# Patient Record
Sex: MALE | Race: BLACK OR AFRICAN AMERICAN | NOT HISPANIC OR LATINO | URBAN - METROPOLITAN AREA
[De-identification: names, ages, dates, MRNs, and addresses within clinical notes are randomized per-mention and may not be internally consistent; named-entity substitution may affect disease eponyms.]

---

## 2019-05-09 ENCOUNTER — EMERGENCY (EMERGENCY)
Facility: HOSPITAL | Age: 64
LOS: 0 days | Discharge: HOME | End: 2019-05-09
Attending: EMERGENCY MEDICINE | Admitting: EMERGENCY MEDICINE
Payer: MEDICARE

## 2019-05-09 VITALS
DIASTOLIC BLOOD PRESSURE: 78 MMHG | OXYGEN SATURATION: 100 % | HEART RATE: 92 BPM | TEMPERATURE: 97 F | RESPIRATION RATE: 18 BRPM | SYSTOLIC BLOOD PRESSURE: 130 MMHG

## 2019-05-09 VITALS — HEIGHT: 68 IN | WEIGHT: 130.07 LBS

## 2019-05-09 DIAGNOSIS — Z91.048 OTHER NONMEDICINAL SUBSTANCE ALLERGY STATUS: ICD-10-CM

## 2019-05-09 DIAGNOSIS — E78.49 OTHER HYPERLIPIDEMIA: ICD-10-CM

## 2019-05-09 DIAGNOSIS — R19.7 DIARRHEA, UNSPECIFIED: ICD-10-CM

## 2019-05-09 DIAGNOSIS — R11.2 NAUSEA WITH VOMITING, UNSPECIFIED: ICD-10-CM

## 2019-05-09 DIAGNOSIS — Z87.891 PERSONAL HISTORY OF NICOTINE DEPENDENCE: ICD-10-CM

## 2019-05-09 DIAGNOSIS — I10 ESSENTIAL (PRIMARY) HYPERTENSION: ICD-10-CM

## 2019-05-09 LAB
ALBUMIN SERPL ELPH-MCNC: 4.1 G/DL — SIGNIFICANT CHANGE UP (ref 3.5–5.2)
ALP SERPL-CCNC: 63 U/L — SIGNIFICANT CHANGE UP (ref 30–115)
ALT FLD-CCNC: 15 U/L — SIGNIFICANT CHANGE UP (ref 0–41)
ANION GAP SERPL CALC-SCNC: 12 MMOL/L — SIGNIFICANT CHANGE UP (ref 7–14)
APPEARANCE UR: CLEAR — SIGNIFICANT CHANGE UP
AST SERPL-CCNC: 24 U/L — SIGNIFICANT CHANGE UP (ref 0–41)
BASOPHILS # BLD AUTO: 0 K/UL — SIGNIFICANT CHANGE UP (ref 0–0.2)
BASOPHILS NFR BLD AUTO: 0 % — SIGNIFICANT CHANGE UP (ref 0–1)
BILIRUB SERPL-MCNC: 0.3 MG/DL — SIGNIFICANT CHANGE UP (ref 0.2–1.2)
BILIRUB UR-MCNC: NEGATIVE — SIGNIFICANT CHANGE UP
BUN SERPL-MCNC: 18 MG/DL — SIGNIFICANT CHANGE UP (ref 10–20)
CALCIUM SERPL-MCNC: 9.2 MG/DL — SIGNIFICANT CHANGE UP (ref 8.5–10.1)
CHLORIDE SERPL-SCNC: 105 MMOL/L — SIGNIFICANT CHANGE UP (ref 98–110)
CO2 SERPL-SCNC: 28 MMOL/L — SIGNIFICANT CHANGE UP (ref 17–32)
COLOR SPEC: YELLOW — SIGNIFICANT CHANGE UP
CREAT SERPL-MCNC: 1 MG/DL — SIGNIFICANT CHANGE UP (ref 0.7–1.5)
DIFF PNL FLD: NEGATIVE — SIGNIFICANT CHANGE UP
EOSINOPHIL # BLD AUTO: 0.06 K/UL — SIGNIFICANT CHANGE UP (ref 0–0.7)
EOSINOPHIL NFR BLD AUTO: 1.3 % — SIGNIFICANT CHANGE UP (ref 0–8)
GLUCOSE SERPL-MCNC: 107 MG/DL — HIGH (ref 70–99)
GLUCOSE UR QL: NEGATIVE MG/DL — SIGNIFICANT CHANGE UP
HCT VFR BLD CALC: 34.8 % — LOW (ref 42–52)
HGB BLD-MCNC: 11.3 G/DL — LOW (ref 14–18)
IMM GRANULOCYTES NFR BLD AUTO: 0.2 % — SIGNIFICANT CHANGE UP (ref 0.1–0.3)
KETONES UR-MCNC: NEGATIVE — SIGNIFICANT CHANGE UP
LEUKOCYTE ESTERASE UR-ACNC: NEGATIVE — SIGNIFICANT CHANGE UP
LIDOCAIN IGE QN: 25 U/L — SIGNIFICANT CHANGE UP (ref 7–60)
LYMPHOCYTES # BLD AUTO: 1.26 K/UL — SIGNIFICANT CHANGE UP (ref 1.2–3.4)
LYMPHOCYTES # BLD AUTO: 26.5 % — SIGNIFICANT CHANGE UP (ref 20.5–51.1)
MCHC RBC-ENTMCNC: 29.3 PG — SIGNIFICANT CHANGE UP (ref 27–31)
MCHC RBC-ENTMCNC: 32.5 G/DL — SIGNIFICANT CHANGE UP (ref 32–37)
MCV RBC AUTO: 90.2 FL — SIGNIFICANT CHANGE UP (ref 80–94)
MONOCYTES # BLD AUTO: 0.5 K/UL — SIGNIFICANT CHANGE UP (ref 0.1–0.6)
MONOCYTES NFR BLD AUTO: 10.5 % — HIGH (ref 1.7–9.3)
NEUTROPHILS # BLD AUTO: 2.93 K/UL — SIGNIFICANT CHANGE UP (ref 1.4–6.5)
NEUTROPHILS NFR BLD AUTO: 61.5 % — SIGNIFICANT CHANGE UP (ref 42.2–75.2)
NITRITE UR-MCNC: NEGATIVE — SIGNIFICANT CHANGE UP
NRBC # BLD: 0 /100 WBCS — SIGNIFICANT CHANGE UP (ref 0–0)
PH UR: 7 — SIGNIFICANT CHANGE UP (ref 5–8)
PLATELET # BLD AUTO: 228 K/UL — SIGNIFICANT CHANGE UP (ref 130–400)
POTASSIUM SERPL-MCNC: 3.5 MMOL/L — SIGNIFICANT CHANGE UP (ref 3.5–5)
POTASSIUM SERPL-SCNC: 3.5 MMOL/L — SIGNIFICANT CHANGE UP (ref 3.5–5)
PROT SERPL-MCNC: 6.3 G/DL — SIGNIFICANT CHANGE UP (ref 6–8)
PROT UR-MCNC: NEGATIVE MG/DL — SIGNIFICANT CHANGE UP
RBC # BLD: 3.86 M/UL — LOW (ref 4.7–6.1)
RBC # FLD: 13.2 % — SIGNIFICANT CHANGE UP (ref 11.5–14.5)
SODIUM SERPL-SCNC: 145 MMOL/L — SIGNIFICANT CHANGE UP (ref 135–146)
SP GR SPEC: 1.01 — SIGNIFICANT CHANGE UP (ref 1.01–1.03)
UROBILINOGEN FLD QL: 0.2 MG/DL — SIGNIFICANT CHANGE UP (ref 0.2–0.2)
WBC # BLD: 4.76 K/UL — LOW (ref 4.8–10.8)
WBC # FLD AUTO: 4.76 K/UL — LOW (ref 4.8–10.8)

## 2019-05-09 PROCEDURE — 99284 EMERGENCY DEPT VISIT MOD MDM: CPT

## 2019-05-09 RX ORDER — FAMOTIDINE 10 MG/ML
20 INJECTION INTRAVENOUS DAILY
Refills: 0 | Status: DISCONTINUED | OUTPATIENT
Start: 2019-05-09 | End: 2019-05-09

## 2019-05-09 RX ORDER — SODIUM CHLORIDE 9 MG/ML
1000 INJECTION INTRAMUSCULAR; INTRAVENOUS; SUBCUTANEOUS ONCE
Refills: 0 | Status: COMPLETED | OUTPATIENT
Start: 2019-05-09 | End: 2019-05-09

## 2019-05-09 RX ORDER — ONDANSETRON 8 MG/1
4 TABLET, FILM COATED ORAL ONCE
Refills: 0 | Status: COMPLETED | OUTPATIENT
Start: 2019-05-09 | End: 2019-05-09

## 2019-05-09 RX ADMIN — FAMOTIDINE 20 MILLIGRAM(S): 10 INJECTION INTRAVENOUS at 12:03

## 2019-05-09 RX ADMIN — SODIUM CHLORIDE 1000 MILLILITER(S): 9 INJECTION INTRAMUSCULAR; INTRAVENOUS; SUBCUTANEOUS at 11:31

## 2019-05-09 RX ADMIN — FAMOTIDINE 104 MILLIGRAM(S): 10 INJECTION INTRAVENOUS at 11:39

## 2019-05-09 RX ADMIN — SODIUM CHLORIDE 1000 MILLILITER(S): 9 INJECTION INTRAMUSCULAR; INTRAVENOUS; SUBCUTANEOUS at 13:42

## 2019-05-09 RX ADMIN — ONDANSETRON 4 MILLIGRAM(S): 8 TABLET, FILM COATED ORAL at 11:31

## 2019-05-09 NOTE — ED PROVIDER NOTE - PMH
Arthritis    Dyslipidemia    Emphysema lung    HTN (hypertension)    Neuropathy    Prostate CA  for 12 years

## 2019-05-09 NOTE — ED PROVIDER NOTE - CLINICAL SUMMARY MEDICAL DECISION MAKING FREE TEXT BOX
pt aware of all labs and imaging, will give copy, reports feeling much better no vomiting or diarrhea in ed, abd re exam soft ntnd, no t/g, tolerated po, aware of signs and symptoms to return for, proper hdyration, will follow up with pmd and gi.

## 2019-05-09 NOTE — ED ADULT NURSE NOTE - OBJECTIVE STATEMENT
patient ate a bunch of different foods, cheese Vincentian pork skin cakes at [facility] then had n/v/d. states no sob

## 2019-05-09 NOTE — ED PROVIDER NOTE - NS ED ROS FT
Constitutional:  No behavior changes, fevers/chills.    Head: No injury, headache, LOC, dizziness/LH.    Eyes:  No visual changes, eye pain, redness, or discharge; no injury; no foreign body sensation.    ENMT:  No ear pain or discharge, hearing problems; no pain or injuries to the nose, ears, mouth, or throat.    Neck:  No pain, stiffness, injury; no loss of range of motion.    Cardiac: No chest pain, palpitations, diaphoresis.    Chest/respiratory: No cough, wheezing, shortness of breath, chest tightness, or trouble breathing; no injuries.    GI: (+) nausea and vomiting, nbnb. (+) Diarrhea. No current abd pain. No changes in PO intake. No melena/brbpr.    :  No dysuria, hematuria, urgency, or injuries. No changes in urine output. No flank pain.    MS: No pain, weakness, injury, numbness or tingling; no loss of range of motion. No edema. No calf pain/swelling/erythema.    Back:  No pain or injury.    Skin:  No rashes or color changes; no lacerations or abrasions.  Social: No sick contacts, recent travel.

## 2019-05-09 NOTE — ED PROVIDER NOTE - NSFOLLOWUPINSTRUCTIONS_ED_ALL_ED_FT
Nausea / Vomiting    Nausea is the feeling that you have to vomit. As nausea gets worse, it can lead to vomiting. Vomiting puts you at an increased risk for dehydration. Older adults and people with other diseases or a weak immune system are at higher risk for dehydration. Drink clear fluids in small but frequent amounts as tolerated. Eat bland, easy-to-digest foods in small amounts as tolerated.    SEEK IMMEDIATE MEDICAL CARE IF YOU HAVE ANY OF THE FOLLOWING SYMPTOMS: fever, inability to keep sufficient fluids down, black or bloody vomitus, black or bloody stools, lightheadedness/dizziness, chest pain, severe headache, rash, shortness of breath, cold or clammy skin, confusion, pain with urination, or any signs of dehydration.    Diarrhea    Diarrhea is frequent loose or watery bowel movements that has many causes. Diarrhea can make you feel weak and cause you to become dehydrated. Diarrhea typically lasts 2–3 days, but can last longer if it is a sign of something more serious. Drink clear fluids to prevent dehydration. Eat bland, easy-to-digest foods as tolerated.     SEEK IMMEDIATE MEDICAL CARE IF YOU HAVE ANY OF THE FOLLOWING SYMPTOMS: high fevers, lightheadedness/dizziness, chest pain, black or bloody stools, shortness of breath, severe abdominal or back pain, or any signs of dehydration.

## 2019-05-09 NOTE — ED PROVIDER NOTE - CARE PROVIDER_API CALL
Charly Gardiner)  Gastroenterology; Internal Medicine  [address] [place] [address]  Phone: [phone]  Fax: [phone]  Follow Up Time:

## 2019-05-09 NOTE — ED PROVIDER NOTE - PHYSICAL EXAMINATION
Vital Signs: I have reviewed the initial vital signs.  Constitutional: WDWN in nad. Sitting on stretcher speaking full sentences.   Integumentary: No rash.  ENT: MMM  NECK: Supple, non-tender, no meningeal signs.  Cardiovascular: RRR, radial pulses 2/4 b/l. No JVD.  Respiratory: BS present b/l, ctabl, no wheezing or crackles, good air exchange, good resp effort and excursion, no accessory muscle use, no stridor.  Gastrointestinal: BS present throughout all 4 quadrants, soft, nd, nt no rebound tenderness or guarding, no cvat. (-) salcido's (-) rovsing (-) obturator (-) psoas  Musculoskeletal: FROM, no edema, no calf pain/swelling/erythema.  Neurologic: AAOx3, motor 5/5 and sensation intact throughout upper and lower ext, CN II-XII intact, No facial droop or slurring of speech. No focal deficits.

## 2019-05-09 NOTE — ED PROVIDER NOTE - OBJECTIVE STATEMENT
A 62 y/o m w/ pmhx of copd not on home 02, ex smoker quit 6 years ago, etoh absue in the past, also stopped drinking ~ 6 years ago, L foot drop, neuripathy, htn, gerd, migraines, prostrate cancer s/p radiation in [place], presents with nausea, vomiting x1 yesterday, nbnb, and diarrhea. started at 6p, took loperamide which helped the diarrhea, one episode today. reports he ate chinese food yesterday including rice, cheese and cake from the restaurant, significnat other at this too but she did not develop symptoms. A 64 y/o m w/ pmhx of copd not on home 02, ex smoker quit 6 years ago, etoh abuse in the past, also stopped drinking ~ 6 years ago, L foot drop, neuropathy, htn, gerd, migraines, prostrate cancer s/p radiation in remission, osteoarthritis, presents with nausea, vomiting x1 yesterday, nbnb, and diarrhea. started at 6p, took loperamide which helped the diarrhea, one episode today. reports he ate chinese food yesterday including rice, cheese and cake from the restaurant, significant other at this too but she did not develop symptoms. developed symptoms shortly after eating the food. No fever, chills,chest pain, sob, abd pain, urinary symptoms, melena/brbpr, flank pain, or recent travel.

## 2019-05-09 NOTE — ED PROVIDER NOTE - NSFOLLOWUPCLINICS_GEN_ALL_ED_FT
[facility]  Medicine  [address], [place]   Phone: [phone]  Fax:   Follow Up Time:

## 2019-05-10 LAB
CULTURE RESULTS: NO GROWTH — SIGNIFICANT CHANGE UP
SPECIMEN SOURCE: SIGNIFICANT CHANGE UP

## 2021-02-22 ENCOUNTER — EMERGENCY (EMERGENCY)
Facility: HOSPITAL | Age: 66
LOS: 0 days | Discharge: HOME | End: 2021-02-22
Attending: EMERGENCY MEDICINE | Admitting: EMERGENCY MEDICINE
Payer: MEDICARE

## 2021-02-22 VITALS
HEIGHT: 68 IN | HEART RATE: 80 BPM | WEIGHT: 160.06 LBS | SYSTOLIC BLOOD PRESSURE: 126 MMHG | OXYGEN SATURATION: 99 % | RESPIRATION RATE: 18 BRPM | TEMPERATURE: 98 F | DIASTOLIC BLOOD PRESSURE: 78 MMHG

## 2021-02-22 DIAGNOSIS — I10 ESSENTIAL (PRIMARY) HYPERTENSION: ICD-10-CM

## 2021-02-22 DIAGNOSIS — E78.5 HYPERLIPIDEMIA, UNSPECIFIED: ICD-10-CM

## 2021-02-22 DIAGNOSIS — K08.89 OTHER SPECIFIED DISORDERS OF TEETH AND SUPPORTING STRUCTURES: ICD-10-CM

## 2021-02-22 DIAGNOSIS — J43.9 EMPHYSEMA, UNSPECIFIED: ICD-10-CM

## 2021-02-22 DIAGNOSIS — Z87.39 PERSONAL HISTORY OF OTHER DISEASES OF THE MUSCULOSKELETAL SYSTEM AND CONNECTIVE TISSUE: ICD-10-CM

## 2021-02-22 PROBLEM — G62.9 POLYNEUROPATHY, UNSPECIFIED: Chronic | Status: ACTIVE | Noted: 2019-05-09

## 2021-02-22 PROBLEM — C61 MALIGNANT NEOPLASM OF PROSTATE: Chronic | Status: ACTIVE | Noted: 2019-05-09

## 2021-02-22 PROBLEM — M19.90 UNSPECIFIED OSTEOARTHRITIS, UNSPECIFIED SITE: Chronic | Status: ACTIVE | Noted: 2019-05-09

## 2021-02-22 PROCEDURE — 99282 EMERGENCY DEPT VISIT SF MDM: CPT

## 2021-02-22 NOTE — ED PROVIDER NOTE - NS ED ROS FT
Eyes:  No visual changes, eye pain or discharge.  ENMT: Endorses dental pain.   Cardiac:  No chest pain, SOB or edema. No chest pain with exertion.  Respiratory:  No cough or respiratory distress. No hemoptysis. No history of asthma or RAD.  GI:  No nausea, vomiting, diarrhea or abdominal pain.  :  No dysuria, frequency or burning.  MS:  No myalgia, muscle weakness, joint pain or back pain.  Neuro:  No headache or weakness.  No LOC.  Skin:  No skin rash.   Endocrine: No history of thyroid disease or diabetes.

## 2021-02-22 NOTE — ED PROVIDER NOTE - CLINICAL SUMMARY MEDICAL DECISION MAKING FREE TEXT BOX
Patient presented with atraumatic right upper molar pain x 1 week. Otherwise afebrile, HD stable, tolerating PO at home, airway patent, clearing secretions without difficulty, speaking full sentences, no tongue elevation or cervical LAD. No signs of abscess on exam. Given the above, will refer to dental clinic for further management. Patient agreeable with plan. Agrees to return to ED for any new or worsening symptoms.

## 2021-02-22 NOTE — ED PROVIDER NOTE - ATTENDING CONTRIBUTION TO CARE
65 year old male, pmhx as documented, presenting with right upper molar pain x 1 week described as achy, non-radiating, worse with chewing, 5/10 at its worst, no palliative factors. Has been able to tolerate PO despite pain. Otherwise denies fevers or any other complaints.    Exam shows (+) tenderness to right upper molars to palpation but no evidence of infection, uvula midline, no other erythema or swelling to other oropharyngeal structures, tolerating PO.    Will refer to dental clinic.

## 2021-02-22 NOTE — ED ADULT NURSE NOTE - NSIMPLEMENTINTERV_GEN_ALL_ED
Implemented All Universal Safety Interventions:  [place] to call system. Call bell, personal items and telephone within reach. Instruct patient to call for assistance. Room bathroom lighting operational. Non-slip footwear when patient is off stretcher. Physically safe environment: no spills, clutter or unnecessary equipment. Stretcher in lowest position, wheels locked, appropriate side rails in place.

## 2021-02-22 NOTE — CONSULT NOTE ADULT - SUBJECTIVE AND OBJECTIVE BOX
Patient is a 65y old  Male who presents with a chief complaint of pain in lower right (#31)    HPI: Pt has pain in lower right starting 2 weeks ago, wants tooth extracted      PAST MEDICAL & SURGICAL HISTORY:  Arthritis    Emphysema lung    Prostate CA  for 12 years    Dyslipidemia    Neuropathy    HTN (hypertension)          MEDICATIONS:  Excedrin  Gabapentin  Calcium  Ambience  Amlodipine      Allergies    dust (Unknown)  cats  No Known Drug Allergies    Intolerances            *SOCIAL HISTORY: ( - ) Tobacco; ( - ) ETOH: history of drug/alcohol abuse, last drink was in 2013    *Last Dental Visit: last week    Vital Signs Last 24 Hrs  T(C): 36.7 (22 Feb 2021 12:50), Max: 36.7 (22 Feb 2021 12:50)  T(F): 98 (22 Feb 2021 12:50), Max: 98 (22 Feb 2021 12:50)  HR: 80 (22 Feb 2021 12:50) (80 - 80)  BP: 126/78 (22 Feb 2021 12:50) (126/78 - 126/78)  BP(mean): --  RR: 18 (22 Feb 2021 12:50) (18 - 18)  SpO2: 99% (22 Feb 2021 12:50) (99% - 99%)    LABS:                  EOE:  TMJ ( - ) clicks                     ( - ) pops                     ( - ) crepitus             Mandible FROM             Facial bones and MOM grossly intact             ( - ) trismus             ( - ) lymphadenopathy             ( - ) swelling             ( - ) asymmetry             ( - ) palpation             ( - ) dyspnea             ( - ) dysphagia             ( - ) loss of consciousness    IOE:   permanent dentition: multiple missing teeth           hard/soft palate:  No pathology noted           tongue/FOM No pathology noted           labial/buccal mucosa No pathology noted           ( +/- ) percussion: #31 slightly positive to percussion           ( - ) palpation           ( - ) swelling            ( - ) abscess           ( - ) sinus tract    Mobility: no  Caries: yes        *DENTAL RADIOGRAPHS: 1 PA, 1 post-ext PA    RADIOLOGY & ADDITIONAL STUDIES:    *ASSESSMENT: caries #31      *PLAN: extraction    PROCEDURE:   Temp: 98.4 F  BP: 140/60 P: 80  K02.9  Tx options of RCT vs ext discussed and explained to pt. Pt opted to have #31 extracted. Risks and benefits discussed as per OS sheet dated 07/13/00. Consent obtained. Side site marked. Anesthesia administered: 1 carpule of 4% Septocaine (1:100k epi) via IANB, 0.5 carpule of 4% Septocaine (1:100k epi) via long buccal nerve block. Tooth #31 sectioned and extracted via elevators and forceps. Socket curetted and irrigated. Hemostasis achieved, gauze placed. Post op instructions given written and verbally: no smoking, no hot/spicy foods or beverages, no spitting, no straw use, soft diet. Rx sent for Amoxicillin 500mg TID x 1 wk, Ibuprofen q4-6hrs prn for pain. No complications.       RECOMMENDATIONS:  1) Finish Rx as prescribed: Amoxicillin 500mg TID x 1 wk, Ibuprofen q4-6hrs prn for pain  2) Follow post op instructions as were given: no smoking, no hot/spicy foods or beverages, no spitting, no straw use, soft diet  3) Dental F/U with outpatient dentist for comprehensive dental care.   4) If any difficulty swallowing/breathing, fever occur, return to ER.     Jil Mcmahon DDS, Pager #[phone]

## 2021-02-22 NOTE — ED PROVIDER NOTE - OBJECTIVE STATEMENT
65 y m p w/ dental pain in the right upper molar for a week. Pain is 5/10, worse with chewing. Pt. feels that his filling has fallen out.

## 2021-02-22 NOTE — ED PROVIDER NOTE - PHYSICAL EXAMINATION
CONSTITUTIONAL: Well-developed; well-nourished; in no acute distress.   SKIN: warm, dry  HEAD: Normocephalic; atraumatic.  EYES: PERRL, EOMI, no conjunctival erythema  ENT: Right upper molar tender to palpation.   NECK: Supple; non tender.  CARD: S1, S2 normal; no murmurs, gallops, or rubs. Regular rate and rhythm.   RESP: No wheezes, rales or rhonchi.  ABD: soft ntnd  EXT: Normal ROM.  No clubbing, cyanosis or edema.   LYMPH: No acute cervical adenopathy.  NEURO: Alert, oriented, grossly unremarkable  PSYCH: Cooperative, appropriate.

## 2022-12-16 ENCOUNTER — INPATIENT (INPATIENT)
Facility: HOSPITAL | Age: 67
LOS: 5 days | Discharge: HOME | End: 2022-12-22
Attending: STUDENT IN AN ORGANIZED HEALTH CARE EDUCATION/TRAINING PROGRAM | Admitting: STUDENT IN AN ORGANIZED HEALTH CARE EDUCATION/TRAINING PROGRAM
Payer: MEDICARE

## 2022-12-16 VITALS
HEART RATE: 68 BPM | DIASTOLIC BLOOD PRESSURE: 64 MMHG | SYSTOLIC BLOOD PRESSURE: 99 MMHG | TEMPERATURE: 96 F | OXYGEN SATURATION: 95 % | RESPIRATION RATE: 20 BRPM

## 2022-12-16 LAB
ACANTHOCYTES BLD QL SMEAR: SLIGHT — SIGNIFICANT CHANGE UP
ALBUMIN SERPL ELPH-MCNC: 5.1 G/DL — SIGNIFICANT CHANGE UP (ref 3.5–5.2)
ALP SERPL-CCNC: 83 U/L — SIGNIFICANT CHANGE UP (ref 30–115)
ALT FLD-CCNC: 15 U/L — SIGNIFICANT CHANGE UP (ref 0–41)
ANION GAP SERPL CALC-SCNC: 18 MMOL/L — HIGH (ref 7–14)
ANISOCYTOSIS BLD QL: SLIGHT — SIGNIFICANT CHANGE UP
APPEARANCE UR: ABNORMAL
AST SERPL-CCNC: 24 U/L — SIGNIFICANT CHANGE UP (ref 0–41)
BASE EXCESS BLDV CALC-SCNC: 6.6 MMOL/L — HIGH (ref -2–3)
BASOPHILS # BLD AUTO: 0 K/UL — SIGNIFICANT CHANGE UP (ref 0–0.2)
BASOPHILS NFR BLD AUTO: 0 % — SIGNIFICANT CHANGE UP (ref 0–1)
BILIRUB SERPL-MCNC: 0.6 MG/DL — SIGNIFICANT CHANGE UP (ref 0.2–1.2)
BILIRUB UR-MCNC: ABNORMAL
BUN SERPL-MCNC: 31 MG/DL — HIGH (ref 10–20)
CA-I SERPL-SCNC: 1.25 MMOL/L — SIGNIFICANT CHANGE UP (ref 1.15–1.33)
CALCIUM SERPL-MCNC: 10.8 MG/DL — HIGH (ref 8.4–10.5)
CHLORIDE SERPL-SCNC: 90 MMOL/L — LOW (ref 98–110)
CO2 SERPL-SCNC: 28 MMOL/L — SIGNIFICANT CHANGE UP (ref 17–32)
COLOR SPEC: ABNORMAL
CREAT SERPL-MCNC: 2.2 MG/DL — HIGH (ref 0.7–1.5)
DIFF PNL FLD: ABNORMAL
EGFR: 32 ML/MIN/1.73M2 — LOW
EOSINOPHIL # BLD AUTO: 0 K/UL — SIGNIFICANT CHANGE UP (ref 0–0.7)
EOSINOPHIL NFR BLD AUTO: 0 % — SIGNIFICANT CHANGE UP (ref 0–8)
FLUAV AG NPH QL: SIGNIFICANT CHANGE UP
FLUBV AG NPH QL: SIGNIFICANT CHANGE UP
GAS PNL BLDV: 131 MMOL/L — LOW (ref 136–145)
GAS PNL BLDV: SIGNIFICANT CHANGE UP
GIANT PLATELETS BLD QL SMEAR: PRESENT — SIGNIFICANT CHANGE UP
GLUCOSE SERPL-MCNC: 137 MG/DL — HIGH (ref 70–99)
GLUCOSE UR QL: SIGNIFICANT CHANGE UP
HCO3 BLDV-SCNC: 34 MMOL/L — HIGH (ref 22–29)
HCT VFR BLD CALC: 46.2 % — SIGNIFICANT CHANGE UP (ref 42–52)
HCT VFR BLDA CALC: 38 % — LOW (ref 39–51)
HGB BLD CALC-MCNC: 12.8 G/DL — SIGNIFICANT CHANGE UP (ref 12.6–17.4)
HGB BLD-MCNC: 15.8 G/DL — SIGNIFICANT CHANGE UP (ref 14–18)
KETONES UR-MCNC: SIGNIFICANT CHANGE UP
LACTATE BLDV-MCNC: 2.5 MMOL/L — HIGH (ref 0.5–2)
LEUKOCYTE ESTERASE UR-ACNC: NEGATIVE — SIGNIFICANT CHANGE UP
LIDOCAIN IGE QN: 22 U/L — SIGNIFICANT CHANGE UP (ref 7–60)
LYMPHOCYTES # BLD AUTO: 1.2 K/UL — SIGNIFICANT CHANGE UP (ref 1.2–3.4)
LYMPHOCYTES # BLD AUTO: 20 % — LOW (ref 20.5–51.1)
MANUAL SMEAR VERIFICATION: SIGNIFICANT CHANGE UP
MCHC RBC-ENTMCNC: 29.9 PG — SIGNIFICANT CHANGE UP (ref 27–31)
MCHC RBC-ENTMCNC: 34.2 G/DL — SIGNIFICANT CHANGE UP (ref 32–37)
MCV RBC AUTO: 87.5 FL — SIGNIFICANT CHANGE UP (ref 80–94)
METAMYELOCYTES # FLD: 6.1 % — HIGH (ref 0–0)
MONOCYTES # BLD AUTO: 1.15 K/UL — HIGH (ref 0.1–0.6)
MONOCYTES NFR BLD AUTO: 19.1 % — HIGH (ref 1.7–9.3)
NEUTROPHILS # BLD AUTO: 3.08 K/UL — SIGNIFICANT CHANGE UP (ref 1.4–6.5)
NEUTROPHILS NFR BLD AUTO: 40.9 % — LOW (ref 42.2–75.2)
NEUTS BAND # BLD: 10.4 % — HIGH (ref 0–6)
NITRITE UR-MCNC: NEGATIVE — SIGNIFICANT CHANGE UP
PCO2 BLDV: 60 MMHG — HIGH (ref 42–55)
PH BLDV: 7.36 — SIGNIFICANT CHANGE UP (ref 7.32–7.43)
PH UR: 6 — SIGNIFICANT CHANGE UP (ref 5–8)
PLAT MORPH BLD: NORMAL — SIGNIFICANT CHANGE UP
PLATELET # BLD AUTO: 337 K/UL — SIGNIFICANT CHANGE UP (ref 130–400)
PO2 BLDV: 24 MMHG — SIGNIFICANT CHANGE UP
POIKILOCYTOSIS BLD QL AUTO: SIGNIFICANT CHANGE UP
POTASSIUM BLDV-SCNC: 3.7 MMOL/L — SIGNIFICANT CHANGE UP (ref 3.5–5.1)
POTASSIUM SERPL-MCNC: 4 MMOL/L — SIGNIFICANT CHANGE UP (ref 3.5–5)
POTASSIUM SERPL-SCNC: 4 MMOL/L — SIGNIFICANT CHANGE UP (ref 3.5–5)
PROT SERPL-MCNC: 8.2 G/DL — HIGH (ref 6–8)
PROT UR-MCNC: ABNORMAL
RBC # BLD: 5.28 M/UL — SIGNIFICANT CHANGE UP (ref 4.7–6.1)
RBC # FLD: 13 % — SIGNIFICANT CHANGE UP (ref 11.5–14.5)
RBC BLD AUTO: ABNORMAL
RSV RNA NPH QL NAA+NON-PROBE: SIGNIFICANT CHANGE UP
SAO2 % BLDV: 34.8 % — SIGNIFICANT CHANGE UP
SARS-COV-2 RNA SPEC QL NAA+PROBE: SIGNIFICANT CHANGE UP
SODIUM SERPL-SCNC: 136 MMOL/L — SIGNIFICANT CHANGE UP (ref 135–146)
SP GR SPEC: 1.03 — SIGNIFICANT CHANGE UP (ref 1.01–1.03)
UROBILINOGEN FLD QL: ABNORMAL
VARIANT LYMPHS # BLD: 3.5 % — SIGNIFICANT CHANGE UP (ref 0–5)
WBC # BLD: 6.01 K/UL — SIGNIFICANT CHANGE UP (ref 4.8–10.8)
WBC # FLD AUTO: 6.01 K/UL — SIGNIFICANT CHANGE UP (ref 4.8–10.8)

## 2022-12-16 PROCEDURE — 74177 CT ABD & PELVIS W/CONTRAST: CPT | Mod: 26,MA

## 2022-12-16 PROCEDURE — 99285 EMERGENCY DEPT VISIT HI MDM: CPT

## 2022-12-16 RX ORDER — SODIUM CHLORIDE 9 MG/ML
2000 INJECTION, SOLUTION INTRAVENOUS ONCE
Refills: 0 | Status: COMPLETED | OUTPATIENT
Start: 2022-12-16 | End: 2022-12-16

## 2022-12-16 RX ORDER — MORPHINE SULFATE 50 MG/1
4 CAPSULE, EXTENDED RELEASE ORAL ONCE
Refills: 0 | Status: DISCONTINUED | OUTPATIENT
Start: 2022-12-16 | End: 2022-12-16

## 2022-12-16 RX ORDER — VANCOMYCIN HCL 1 G
1000 VIAL (EA) INTRAVENOUS ONCE
Refills: 0 | Status: COMPLETED | OUTPATIENT
Start: 2022-12-16 | End: 2022-12-16

## 2022-12-16 RX ORDER — CEFEPIME 1 G/1
2000 INJECTION, POWDER, FOR SOLUTION INTRAMUSCULAR; INTRAVENOUS ONCE
Refills: 0 | Status: COMPLETED | OUTPATIENT
Start: 2022-12-16 | End: 2022-12-16

## 2022-12-16 RX ORDER — ONDANSETRON 8 MG/1
4 TABLET, FILM COATED ORAL ONCE
Refills: 0 | Status: COMPLETED | OUTPATIENT
Start: 2022-12-16 | End: 2022-12-16

## 2022-12-16 RX ORDER — ACETAMINOPHEN 500 MG
650 TABLET ORAL ONCE
Refills: 0 | Status: COMPLETED | OUTPATIENT
Start: 2022-12-16 | End: 2022-12-16

## 2022-12-16 RX ADMIN — Medication 650 MILLIGRAM(S): at 21:23

## 2022-12-16 RX ADMIN — SODIUM CHLORIDE 2000 MILLILITER(S): 9 INJECTION, SOLUTION INTRAVENOUS at 21:04

## 2022-12-16 RX ADMIN — MORPHINE SULFATE 4 MILLIGRAM(S): 50 CAPSULE, EXTENDED RELEASE ORAL at 21:05

## 2022-12-16 RX ADMIN — ONDANSETRON 4 MILLIGRAM(S): 8 TABLET, FILM COATED ORAL at 21:05

## 2022-12-16 RX ADMIN — Medication 250 MILLIGRAM(S): at 23:00

## 2022-12-16 NOTE — ED PROVIDER NOTE - CLINICAL SUMMARY MEDICAL DECISION MAKING FREE TEXT BOX
ct showing sbo 2/2 incarcerated hernia, abx given for bandemia  surg c/s placed, NGT placed and hernia reduced  surg rec admission for monitoring and medical management

## 2022-12-16 NOTE — ED PROVIDER NOTE - ATTENDING CONTRIBUTION TO CARE
67 y.o. M, PMH of HTN, HLD, COPD not on home O2, Prostate Ca s/p radiation 4 years ago, recent penile implant 4 weeks ago (Dr. Juarez, [facility]) presenting with 3 days of constant, non-radiating, sharp periumbilical abdominal pain associated with nausea and 3 episodes of NBNB vomiting and decreased PO intake. Patient denies new foods/medications. States felt feverish at home but did not take his temp. No CP, SOB, diarrhea, dysuria, hematuria, melena, hematochezia, recent travel/sick contacts. Former alcohol user 10 years ago. On exam, pt in NAD, AAOx3, head NC/AT, CN II-XII intact, PEERL, EOMi, neck (-) midline tenderness, lungs CTA B/L, CV S1S2 regular, abdomen soft/discomfort over left side of abdomen, firm abdomen/ND/(+)BS, ext (-) edema, motor 5/5x4, sensation intact, ambulating with steady gait. Will do labs, CT, UA and reevaluate. 67 y.o. M, PMH of HTN, HLD, COPD not on home O2, Prostate Ca s/p radiation 4 years ago, recent penile implant 4 weeks ago (Dr. Juarez, [facility]) presenting with 3 days of constant, non-radiating, sharp periumbilical abdominal pain associated with nausea and 3 episodes of NBNB vomiting and decreased PO intake. Patient denies new foods/medications. States felt feverish at home but did not take his temp. No CP, SOB, diarrhea, dysuria, hematuria, melena, hematochezia, recent travel/sick contacts. no testicular pain. Former alcohol user 10 years ago. On exam, pt in NAD, AAOx3, head NC/AT, CN II-XII intact, PEERL, EOMi, neck (-) midline tenderness, lungs CTA B/L, CV S1S2 regular, abdomen soft/discomfort over left side of abdomen, firm abdomen/ND/(+)BS, ext (-) edema, motor 5/5x4, sensation intact, ambulating with steady gait. Will do labs, CT, UA and reevaluate.

## 2022-12-16 NOTE — ED PROVIDER NOTE - OBJECTIVE STATEMENT
66 yo M with PMH of HTN, HLD, COPD not on home O2, PrCa s/p radiation 4 years ago, recent penile implant 4 weeks ago (Dr. Juarez, [facility]) presenting with 3 days of constant, non-radiating, sharp periumbilical abdominal pain associated with nausea and 3 episodes of NBNB vomiting and decreased PO intake. Patient denies new foods/medications, CP, SOB, diarrhea, dysuria, hematuria, melena, hematochezia, recent travel/sick contacts, fever. Former alcohol user 10 years ago.

## 2022-12-16 NOTE — ED PROVIDER NOTE - PHYSICAL EXAMINATION
CONSTITUTIONAL: well-appearing, in NAD  SKIN: Warm dry, normal skin turgor  HEAD: NCAT  EYES: EOMI, PERRLA, no scleral icterus, conjunctiva pink  ENT: normal pharynx with no erythema or exudates  NECK: Supple; non tender. Full ROM.  CARD: RRR, no murmurs.  RESP: clear to ausculation b/l. No crackles or wheezing.  ABD: soft, tender to periumbilical area, non-distended, no rebound or guarding; no CVA tenderness  EXT: Full ROM, no bony tenderness, no pedal edema, no calf tenderness  NEURO: normal motor. normal sensory. Normal gait.  PSYCH: Cooperative, appropriate.

## 2022-12-16 NOTE — ED PROVIDER NOTE - NS ED ROS FT
Constitutional: (-) fever, (-) chills, (-) lethargy  Eyes: (-) eye pain, (-) visual changes, (-) discharge  ENMT: (-) nasal congestion, (-) sore throat. (-) neck pain (-) neck stiffness  Respiratory: (-) cough, (-) SOB, (-) LUU, (-) respiratory distress  Cardiac: (-) chest pain, (-) palpitations  GI: (+) abdominal pain, (+) nausea, (+) vomiting, (-) diarrhea.  :  (-) dysuria, (-) hematuria, (-) frequency   MS:  (-) back pain, (-) joint pain.  Neuro:  (-) headache, (-) numbness, (-) focal weakness, (-) tingling   Skin:  (-) rash  Except as documented in the HPI,  all other systems are negative

## 2022-12-16 NOTE — ED PROVIDER NOTE - PROGRESS NOTE DETAILS
Pt signed out to Dr. Patrick pending CT, reevaluation and dispo. BK: Patient with SBO, surgery consulted. Pre-op labs ordered.

## 2022-12-17 LAB
ANION GAP SERPL CALC-SCNC: 14 MMOL/L — SIGNIFICANT CHANGE UP (ref 7–14)
ANION GAP SERPL CALC-SCNC: 9 MMOL/L — SIGNIFICANT CHANGE UP (ref 7–14)
APTT BLD: 26.7 SEC — LOW (ref 27–39.2)
BACTERIA # UR AUTO: ABNORMAL
BASE EXCESS BLDV CALC-SCNC: 7.1 MMOL/L — HIGH (ref -2–3)
BASOPHILS # BLD AUTO: 0.01 K/UL — SIGNIFICANT CHANGE UP (ref 0–0.2)
BASOPHILS # BLD AUTO: 0.02 K/UL — SIGNIFICANT CHANGE UP (ref 0–0.2)
BASOPHILS NFR BLD AUTO: 0.5 % — SIGNIFICANT CHANGE UP (ref 0–1)
BASOPHILS NFR BLD AUTO: 0.7 % — SIGNIFICANT CHANGE UP (ref 0–1)
BLD GP AB SCN SERPL QL: SIGNIFICANT CHANGE UP
BUN SERPL-MCNC: 26 MG/DL — HIGH (ref 10–20)
BUN SERPL-MCNC: 33 MG/DL — HIGH (ref 10–20)
CA-I SERPL-SCNC: 1.26 MMOL/L — SIGNIFICANT CHANGE UP (ref 1.15–1.33)
CALCIUM SERPL-MCNC: 9.1 MG/DL — SIGNIFICANT CHANGE UP (ref 8.4–10.5)
CALCIUM SERPL-MCNC: 9.4 MG/DL — SIGNIFICANT CHANGE UP (ref 8.4–10.5)
CHLORIDE SERPL-SCNC: 93 MMOL/L — LOW (ref 98–110)
CHLORIDE SERPL-SCNC: 93 MMOL/L — LOW (ref 98–110)
CO2 SERPL-SCNC: 29 MMOL/L — SIGNIFICANT CHANGE UP (ref 17–32)
CO2 SERPL-SCNC: 29 MMOL/L — SIGNIFICANT CHANGE UP (ref 17–32)
COD CRY URNS QL: ABNORMAL
COMMENT - URINE: SIGNIFICANT CHANGE UP
CREAT SERPL-MCNC: 0.9 MG/DL — SIGNIFICANT CHANGE UP (ref 0.7–1.5)
CREAT SERPL-MCNC: 1.5 MG/DL — SIGNIFICANT CHANGE UP (ref 0.7–1.5)
EGFR: 51 ML/MIN/1.73M2 — LOW
EGFR: 94 ML/MIN/1.73M2 — SIGNIFICANT CHANGE UP
EOSINOPHIL # BLD AUTO: 0.02 K/UL — SIGNIFICANT CHANGE UP (ref 0–0.7)
EOSINOPHIL # BLD AUTO: 0.05 K/UL — SIGNIFICANT CHANGE UP (ref 0–0.7)
EOSINOPHIL NFR BLD AUTO: 0.9 % — SIGNIFICANT CHANGE UP (ref 0–8)
EOSINOPHIL NFR BLD AUTO: 1.8 % — SIGNIFICANT CHANGE UP (ref 0–8)
EPI CELLS # UR: 4 /HPF — SIGNIFICANT CHANGE UP (ref 0–5)
GAS PNL BLDV: 130 MMOL/L — LOW (ref 136–145)
GAS PNL BLDV: SIGNIFICANT CHANGE UP
GLUCOSE SERPL-MCNC: 127 MG/DL — HIGH (ref 70–99)
GLUCOSE SERPL-MCNC: 137 MG/DL — HIGH (ref 70–99)
GRAN CASTS # UR COMP ASSIST: 2 /LPF — HIGH
HCO3 BLDV-SCNC: 33 MMOL/L — HIGH (ref 22–29)
HCT VFR BLD CALC: 40.3 % — LOW (ref 42–52)
HCT VFR BLD CALC: 41.2 % — LOW (ref 42–52)
HCT VFR BLDA CALC: 44 % — SIGNIFICANT CHANGE UP (ref 39–51)
HCV AB S/CO SERPL IA: 0.05 COI — SIGNIFICANT CHANGE UP
HCV AB SERPL-IMP: SIGNIFICANT CHANGE UP
HGB BLD CALC-MCNC: 14.7 G/DL — SIGNIFICANT CHANGE UP (ref 12.6–17.4)
HGB BLD-MCNC: 13.7 G/DL — LOW (ref 14–18)
HGB BLD-MCNC: 13.8 G/DL — LOW (ref 14–18)
HYALINE CASTS # UR AUTO: 2 /LPF — SIGNIFICANT CHANGE UP (ref 0–7)
IMM GRANULOCYTES NFR BLD AUTO: 0.4 % — HIGH (ref 0.1–0.3)
IMM GRANULOCYTES NFR BLD AUTO: 0.5 % — HIGH (ref 0.1–0.3)
INR BLD: 0.98 RATIO — SIGNIFICANT CHANGE UP (ref 0.65–1.3)
LACTATE BLDV-MCNC: 2 MMOL/L — SIGNIFICANT CHANGE UP (ref 0.5–2)
LACTATE SERPL-SCNC: 1.3 MMOL/L — SIGNIFICANT CHANGE UP (ref 0.7–2)
LYMPHOCYTES # BLD AUTO: 0.55 K/UL — LOW (ref 1.2–3.4)
LYMPHOCYTES # BLD AUTO: 0.71 K/UL — LOW (ref 1.2–3.4)
LYMPHOCYTES # BLD AUTO: 25 % — SIGNIFICANT CHANGE UP (ref 20.5–51.1)
LYMPHOCYTES # BLD AUTO: 25.6 % — SIGNIFICANT CHANGE UP (ref 20.5–51.1)
MAGNESIUM SERPL-MCNC: 2.1 MG/DL — SIGNIFICANT CHANGE UP (ref 1.8–2.4)
MAGNESIUM SERPL-MCNC: 2.4 MG/DL — SIGNIFICANT CHANGE UP (ref 1.8–2.4)
MCHC RBC-ENTMCNC: 29.6 PG — SIGNIFICANT CHANGE UP (ref 27–31)
MCHC RBC-ENTMCNC: 29.9 PG — SIGNIFICANT CHANGE UP (ref 27–31)
MCHC RBC-ENTMCNC: 33.3 G/DL — SIGNIFICANT CHANGE UP (ref 32–37)
MCHC RBC-ENTMCNC: 34.2 G/DL — SIGNIFICANT CHANGE UP (ref 32–37)
MCV RBC AUTO: 87.4 FL — SIGNIFICANT CHANGE UP (ref 80–94)
MCV RBC AUTO: 89 FL — SIGNIFICANT CHANGE UP (ref 80–94)
MONOCYTES # BLD AUTO: 0.53 K/UL — SIGNIFICANT CHANGE UP (ref 0.1–0.6)
MONOCYTES # BLD AUTO: 0.57 K/UL — SIGNIFICANT CHANGE UP (ref 0.1–0.6)
MONOCYTES NFR BLD AUTO: 20.1 % — HIGH (ref 1.7–9.3)
MONOCYTES NFR BLD AUTO: 24.7 % — HIGH (ref 1.7–9.3)
NEUTROPHILS # BLD AUTO: 1.03 K/UL — LOW (ref 1.4–6.5)
NEUTROPHILS # BLD AUTO: 1.48 K/UL — SIGNIFICANT CHANGE UP (ref 1.4–6.5)
NEUTROPHILS NFR BLD AUTO: 47.8 % — SIGNIFICANT CHANGE UP (ref 42.2–75.2)
NEUTROPHILS NFR BLD AUTO: 52 % — SIGNIFICANT CHANGE UP (ref 42.2–75.2)
NRBC # BLD: 0 /100 WBCS — SIGNIFICANT CHANGE UP (ref 0–0)
NRBC # BLD: 0 /100 WBCS — SIGNIFICANT CHANGE UP (ref 0–0)
PCO2 BLDV: 48 MMHG — SIGNIFICANT CHANGE UP (ref 42–55)
PH BLDV: 7.44 — HIGH (ref 7.32–7.43)
PHOSPHATE SERPL-MCNC: 2.7 MG/DL — SIGNIFICANT CHANGE UP (ref 2.1–4.9)
PHOSPHATE SERPL-MCNC: 4.4 MG/DL — SIGNIFICANT CHANGE UP (ref 2.1–4.9)
PLATELET # BLD AUTO: 274 K/UL — SIGNIFICANT CHANGE UP (ref 130–400)
PLATELET # BLD AUTO: 291 K/UL — SIGNIFICANT CHANGE UP (ref 130–400)
PO2 BLDV: 49 MMHG — SIGNIFICANT CHANGE UP
POTASSIUM BLDV-SCNC: 3.9 MMOL/L — SIGNIFICANT CHANGE UP (ref 3.5–5.1)
POTASSIUM SERPL-MCNC: 4.2 MMOL/L — SIGNIFICANT CHANGE UP (ref 3.5–5)
POTASSIUM SERPL-MCNC: 4.7 MMOL/L — SIGNIFICANT CHANGE UP (ref 3.5–5)
POTASSIUM SERPL-SCNC: 4.2 MMOL/L — SIGNIFICANT CHANGE UP (ref 3.5–5)
POTASSIUM SERPL-SCNC: 4.7 MMOL/L — SIGNIFICANT CHANGE UP (ref 3.5–5)
PROTHROM AB SERPL-ACNC: 11.2 SEC — SIGNIFICANT CHANGE UP (ref 9.95–12.87)
RBC # BLD: 4.61 M/UL — LOW (ref 4.7–6.1)
RBC # BLD: 4.63 M/UL — LOW (ref 4.7–6.1)
RBC # FLD: 13.1 % — SIGNIFICANT CHANGE UP (ref 11.5–14.5)
RBC # FLD: 13.3 % — SIGNIFICANT CHANGE UP (ref 11.5–14.5)
RBC CASTS # UR COMP ASSIST: 10 /HPF — HIGH (ref 0–4)
SAO2 % BLDV: 81.5 % — SIGNIFICANT CHANGE UP
SODIUM SERPL-SCNC: 131 MMOL/L — LOW (ref 135–146)
SODIUM SERPL-SCNC: 136 MMOL/L — SIGNIFICANT CHANGE UP (ref 135–146)
WBC # BLD: 2.15 K/UL — LOW (ref 4.8–10.8)
WBC # BLD: 2.84 K/UL — LOW (ref 4.8–10.8)
WBC # FLD AUTO: 2.15 K/UL — LOW (ref 4.8–10.8)
WBC # FLD AUTO: 2.84 K/UL — LOW (ref 4.8–10.8)
WBC UR QL: >20 /HPF — HIGH (ref 0–5)

## 2022-12-17 PROCEDURE — 71045 X-RAY EXAM CHEST 1 VIEW: CPT | Mod: 26

## 2022-12-17 PROCEDURE — 99223 1ST HOSP IP/OBS HIGH 75: CPT

## 2022-12-17 PROCEDURE — 93010 ELECTROCARDIOGRAM REPORT: CPT | Mod: 77

## 2022-12-17 PROCEDURE — 99222 1ST HOSP IP/OBS MODERATE 55: CPT | Mod: GC

## 2022-12-17 PROCEDURE — 93010 ELECTROCARDIOGRAM REPORT: CPT

## 2022-12-17 PROCEDURE — 99221 1ST HOSP IP/OBS SF/LOW 40: CPT

## 2022-12-17 PROCEDURE — 74021 RADEX ABDOMEN 3+ VIEWS: CPT | Mod: 26

## 2022-12-17 RX ORDER — FINASTERIDE 5 MG/1
5 TABLET, FILM COATED ORAL DAILY
Refills: 0 | Status: DISCONTINUED | OUTPATIENT
Start: 2022-12-17 | End: 2022-12-22

## 2022-12-17 RX ORDER — GABAPENTIN 400 MG/1
600 CAPSULE ORAL THREE TIMES A DAY
Refills: 0 | Status: DISCONTINUED | OUTPATIENT
Start: 2022-12-17 | End: 2022-12-18

## 2022-12-17 RX ORDER — AMLODIPINE BESYLATE 2.5 MG/1
5 TABLET ORAL AT BEDTIME
Refills: 0 | Status: DISCONTINUED | OUTPATIENT
Start: 2022-12-17 | End: 2022-12-22

## 2022-12-17 RX ORDER — HEPARIN SODIUM 5000 [USP'U]/ML
5000 INJECTION INTRAVENOUS; SUBCUTANEOUS EVERY 8 HOURS
Refills: 0 | Status: DISCONTINUED | OUTPATIENT
Start: 2022-12-17 | End: 2022-12-22

## 2022-12-17 RX ORDER — PANTOPRAZOLE SODIUM 20 MG/1
40 TABLET, DELAYED RELEASE ORAL DAILY
Refills: 0 | Status: DISCONTINUED | OUTPATIENT
Start: 2022-12-17 | End: 2022-12-22

## 2022-12-17 RX ORDER — MONTELUKAST 4 MG/1
10 TABLET, CHEWABLE ORAL DAILY
Refills: 0 | Status: DISCONTINUED | OUTPATIENT
Start: 2022-12-17 | End: 2022-12-22

## 2022-12-17 RX ORDER — ACETAMINOPHEN 500 MG
650 TABLET ORAL EVERY 6 HOURS
Refills: 0 | Status: DISCONTINUED | OUTPATIENT
Start: 2022-12-17 | End: 2022-12-22

## 2022-12-17 RX ORDER — SODIUM CHLORIDE 9 MG/ML
1000 INJECTION, SOLUTION INTRAVENOUS
Refills: 0 | Status: DISCONTINUED | OUTPATIENT
Start: 2022-12-17 | End: 2022-12-20

## 2022-12-17 RX ORDER — TAMSULOSIN HYDROCHLORIDE 0.4 MG/1
0.4 CAPSULE ORAL AT BEDTIME
Refills: 0 | Status: DISCONTINUED | OUTPATIENT
Start: 2022-12-17 | End: 2022-12-22

## 2022-12-17 RX ADMIN — HEPARIN SODIUM 5000 UNIT(S): 5000 INJECTION INTRAVENOUS; SUBCUTANEOUS at 21:14

## 2022-12-17 RX ADMIN — Medication 650 MILLIGRAM(S): at 13:25

## 2022-12-17 RX ADMIN — CEFEPIME 100 MILLIGRAM(S): 1 INJECTION, POWDER, FOR SOLUTION INTRAMUSCULAR; INTRAVENOUS at 02:07

## 2022-12-17 RX ADMIN — Medication 650 MILLIGRAM(S): at 12:22

## 2022-12-17 RX ADMIN — HEPARIN SODIUM 5000 UNIT(S): 5000 INJECTION INTRAVENOUS; SUBCUTANEOUS at 15:15

## 2022-12-17 RX ADMIN — GABAPENTIN 600 MILLIGRAM(S): 400 CAPSULE ORAL at 15:14

## 2022-12-17 RX ADMIN — PANTOPRAZOLE SODIUM 40 MILLIGRAM(S): 20 TABLET, DELAYED RELEASE ORAL at 11:27

## 2022-12-17 RX ADMIN — SODIUM CHLORIDE 125 MILLILITER(S): 9 INJECTION, SOLUTION INTRAVENOUS at 04:23

## 2022-12-17 RX ADMIN — AMLODIPINE BESYLATE 5 MILLIGRAM(S): 2.5 TABLET ORAL at 21:14

## 2022-12-17 RX ADMIN — HEPARIN SODIUM 5000 UNIT(S): 5000 INJECTION INTRAVENOUS; SUBCUTANEOUS at 07:00

## 2022-12-17 NOTE — H&P ADULT - NSICDXPASTMEDICALHX_GEN_ALL_CORE_FT
PAST MEDICAL HISTORY:  Arthritis     Dyslipidemia     Emphysema lung     HTN (hypertension)     Neuropathy     Prostate CA for 12 years

## 2022-12-17 NOTE — H&P ADULT - NSHPPHYSICALEXAM_GEN_ALL_CORE
PHYSICAL EXAM:  General: NAD, calm and cooperative.  Cardiac: RRR.  Respiratory: On RA. Speaks in complete sentences. No accessory muscles of respiration in use. Bilateral chest rise.  Abdomen: Soft, non-distended, mild tenderness LLQ, no rebound, no guarding. Reducible L inguinal hernia w/o overlying skin changes.  Neuro: Moves all extremities.  Vascular: Extremities well perfused.  Skin: Warm/dry, normal color, no jaundice. PHYSICAL EXAM:  General: NAD, calm and cooperative.  Cardiac: RRR.  Respiratory: On RA. Speaks in complete sentences. No accessory muscles of respiration in use. Bilateral chest rise.  Abdomen: Soft, non-distended, mild tenderness LLQ, no rebound, no guarding. Reducible L inguinal hernia w/o overlying skin changes.  Groin: normal male genitalia  Neuro: Moves all extremities.  Vascular: Extremities well perfused.  Skin: Warm/dry, normal color, no jaundice.

## 2022-12-17 NOTE — H&P ADULT - ATTENDING COMMENTS
This is a 66yo man with history of HTN, HLD and COPD. He has a history of RIH s/p open repair in the past. He also underwent elective pen[facility] and a prior bladder sling placement. The pt presents in the setting of ~3d abdominal pain and distention. It was a/w nausea and bilious emesis as well as obstipation, and a new bulge in the L groin which he had not noticed previously. Per pt, the bulge was painful though noncellulitic. He presented to the ED for further evaluation where he was noted to have a LIH with some tenderness. Labs show normal WBC ct; and serum lactic acid. CT was obtained and confirmed LIH with a loop of small bowel being involved as the apparent transition point, with resulting SBO and impressive proximal SB dilatation. Of note, no evidence of pneumatosis intestinalis or ascites on this imaging, or other e/o ischemia. Penile implant pump observed in the pelvis. The surgical team was consulted and the hernia was successfully and uneventfully reduced. An NG tube was placed for decompression and the pt was admitted to our service.    This morning, the pt feels well. Passed several liquid BM with flatus. He denies any abdominal or groin pain. No nausea or recurrent emesis; NG with minimal output. His examination demonstrates a nontender abdomen, flat, with a reduced LIH and no overlying tenderness in this area. Defect is palpable and approximately the size of a dime. An XR was repeated this morning per my request and shows improving SB dilation.    The pt would benefit from repair of the hernia prior to discharge. We discussed this, and that it is my preference to allow for decompression and resolution of the obstruction prior to repair, which will make minimally invasive approach more technically feasible. Will need to coordinate with OR for robotic vs laparoscopic availability.  In interim, will follow his exam and bowel fxn clinically, and possibly remove NG in next 24h or so. Involve urology to discuss penile implant, with tubing within the L inguinal canal, and the implications of this upon repair. Home meds. PPI.  Ambulate. DVT ppx with SQH. The care plan was d/w pt and he understands all of the above. This is a 68yo man with history of HTN, HLD and COPD. He has a history of RIH s/p open repair in the past. He also underwent elective [facility] and a prior bladder sling placement. The pt presents in the setting of ~3d abdominal pain and distention. It was a/w nausea and bilious emesis as well as obstipation, and a new bulge in the L groin which he had not noticed previously. Per pt, the bulge was painful though noncellulitic. He presented to the ED for further evaluation where he was noted to have a LIH with some tenderness. Labs show normal WBC ct; and essentially normal serum lactic acid. Cr 2.2. CT was obtained and confirmed LIH with a loop of small bowel being involved as the apparent transition point, with resulting SBO and impressive proximal SB dilatation. Of note, no evidence of pneumatosis intestinalis or ascites on this imaging, or other e/o ischemia. Penile implant pump observed in the pelvis. The surgical team was consulted and the hernia was successfully and uneventfully reduced. An NG tube was placed for decompression and the pt was admitted to our service.    This morning, the pt feels well. Passed several liquid BM with flatus. He denies any abdominal or groin pain. No nausea or recurrent emesis; NG with minimal output. His examination demonstrates a nontender abdomen, flat, with a reduced LIH and no overlying tenderness in this area. Defect is palpable and approximately the size of a dime. An XR was repeated this morning per my request and shows improving SB dilation.    The pt would benefit from repair of the hernia prior to discharge. We discussed this, and that it is my preference to allow for decompression and resolution of the obstruction prior to repair, which will make minimally invasive approach more technically feasible. Will need to coordinate with OR for robotic vs laparoscopic availability.  In interim, will follow his exam and bowel fxn clinically, and possibly remove NG in next 24h or so. Involve urology to discuss penile implant, with tubing within the L inguinal canal, and the implications of this upon repair. He is presenting with OLY, lkely prerenal azotemia in setting of recent vomiting, so will hydrate and follow Cr. Home meds. PPI.  Ambulate. DVT ppx with SQH. The care plan was d/w pt and he understands all of the above.

## 2022-12-17 NOTE — H&P ADULT - NSHPLABSRESULTS_GEN_ALL_CORE
CBC Full  -  ( 16 Dec 2022 20:38 )  WBC Count : 6.01 K/uL  RBC Count : 5.28 M/uL  Hemoglobin : 15.8 g/dL  Hematocrit : 46.2 %  Platelet Count - Automated : 337 K/uL  Mean Cell Volume : 87.5 fL  Mean Cell Hemoglobin : 29.9 pg  Mean Cell Hemoglobin Concentration : 34.2 g/dL  Auto Neutrophil # : 3.08 K/uL  Auto Lymphocyte # : 1.20 K/uL  Auto Monocyte # : 1.15 K/uL  Auto Eosinophil # : 0.00 K/uL  Auto Basophil # : 0.00 K/uL  Auto Neutrophil % : 40.9 %  Auto Lymphocyte % : 20.0 %  Auto Monocyte % : 19.1 %  Auto Eosinophil % : 0.0 %  Auto Basophil % : 0.0 %                            15.8   6.01  )-----------( 337      ( 16 Dec 2022 20:38 )             46.2       12-16    136  |  90<L>  |  31<H>  ----------------------------<  137<H>  4.0   |  28  |  2.2<H>    Ca    10.8<H>      16 Dec 2022 20:38    TPro  8.2<H>  /  Alb  5.1  /  TBili  0.6  /  DBili  x   /  AST  24  /  ALT  15  /  AlkPhos  83  12-16              Urinalysis Basic - ( 16 Dec 2022 22:51 )    Color: Michelle / Appearance: Slightly Turbid / S.029 / pH: x  Gluc: x / Ketone: Trace  / Bili: Small / Urobili: 3 mg/dL   Blood: x / Protein: 100 mg/dL / Nitrite: Negative   Leuk Esterase: Negative / RBC: 10 /HPF / WBC >20 /HPF   Sq Epi: x / Non Sq Epi: 4 /HPF / Bacteria: Few      < from: CT Abdomen and Pelvis w/ IV Cont (22 @ 23:32) >    Penile pump with reservoir in the deep pelvis and tubing material   traveling through left inguinal canal. Small left inguinal hernia   containing short loop of collapsed bowel, with collapsed distal ileum   exiting. Proximal diffuse abnormal small bowel dilation, up to 3.8 cm   diameter. Findings compatible with small bowel obstruction to level of   left inguinal hernia.    < end of copied text > 15.8   6.01  )-----------( 337      ( 16 Dec 2022 20:38 )             46.2     12-16  136  |  90<L>  |  31<H>  ----------------------------<  137<H>  4.0   |  28  |  2.2<H>    Ca    10.8<H>      16 Dec 2022 20:38    TPro  8.2<H>  /  Alb  5.1  /  TBili  0.6  /  DBili  x   /  AST  24  /  ALT  15  /  AlkPhos  83  12-16          Urinalysis Basic - ( 16 Dec 2022 22:51 )  Color: Michelle / Appearance: Slightly Turbid / S.029 / pH: x  Gluc: x / Ketone: Trace  / Bili: Small / Urobili: 3 mg/dL   Blood: x / Protein: 100 mg/dL / Nitrite: Negative   Leuk Esterase: Negative / RBC: 10 /HPF / WBC >20 /HPF   Sq Epi: x / Non Sq Epi: 4 /HPF / Bacteria: Few      < from: CT Abdomen and Pelvis w/ IV Cont (22 @ 23:32) >    Penile pump with reservoir in the deep pelvis and tubing material   traveling through left inguinal canal. Small left inguinal hernia   containing short loop of collapsed bowel, with collapsed distal ileum   exiting. Proximal diffuse abnormal small bowel dilation, up to 3.8 cm   diameter. Findings compatible with small bowel obstruction to level of   left inguinal hernia.    < end of copied text >

## 2022-12-17 NOTE — CONSULT NOTE ADULT - SUBJECTIVE AND OBJECTIVE BOX
Cardiologist: none    HPI:  67M w/ PMHx of migraines, HTN, HLD, COPD not on home O2, Prostate Cancer s/p radiation and resection 4 years ago, recent replacement penile implant 4 weeks ago (Dr. Juarez, [facility]), H/O EtOH abuse quit in 2013, tobacco use quit in 2013, illicit drug use including cocaine, quit in 2005, presenting with 3 days of steady 5-6/10 abdominal pain associated w/ nausea and lightheadedness x 3 days. Since yesterday, patient stated he has started having episodes of NBNB emesis following even small meals and stopped having BMs. Patient stated he tried milk of magnesia and only had a very small BM. Patient states his last colonoscopy was 5 years ago and was significant for 2 noncancerous polyps which were excised. Patient endorses using cocaine, crack, and drinking alcohol but states he has not consumed these for several years.     Surgery was consulted for evaluation of possible SBO in setting of L inguinal hernia. CT scan was significant for severely dilated loops of small bowel with air fluid levels and a transition point near the L inguinal hernia. NGT was placed and 400cc fluid obtained upon insertion. Hernia was reduced in ED. Patient denies any other symptoms.    (17 Dec 2022 01:53)      PAST MEDICAL & SURGICAL HISTORY  HTN (hypertension)    Neuropathy    Dyslipidemia    Prostate CA  for 12 years    Emphysema lung    Arthritis    No significant past surgical history      FAMILY HISTORY:  FAMILY HISTORY:    [ ] no pertinent family history of premature cardiovascular disease in first degree relatives.  Mother:   Father:   Siblings:     SOCIAL HISTORY:  [x]former smoker, quit 2013  [x]former alcohol misuse, quit 2013  [x]former crack cocaine use, quit 2005    ALLERGIES:  dust (Unknown)  No Known Drug Allergies    MEDICATIONS:  MEDICATIONS  (STANDING):  dextrose 5% + sodium chloride 0.9%. 1000 milliLiter(s) (125 mL/Hr) IV Continuous <Continuous>  heparin   Injectable 5000 Unit(s) SubCutaneous every 8 hours  pantoprazole  Injectable 40 milliGRAM(s) IV Push daily    HOME MEDICATIONS:  Home Medications:  Ambien: 12.5 milligram(s) orally once a day (at bedtime), As Needed (17 Dec 2022 02:21)  amLODIPine 5 mg oral tablet: 1 tab(s) orally once a day (17 Dec 2022 02:21)  famotidine 20 mg oral tablet: 1 tab(s) orally once a day, As Needed (17 Dec 2022 02:21)  finasteride 5 mg oral tablet: 1 tab(s) orally once a day (17 Dec 2022 02:21)  gabapentin 600 mg oral tablet: 1 tab(s) orally 3 times a day (17 Dec 2022 02:21)  montelukast 10 mg oral tablet: 1 tab(s) orally once a day (17 Dec 2022 02:21)  tamsulosin 0.4 mg oral capsule: 1 cap(s) orally once a day (17 Dec 2022 02:21)    VITALS:   T(F): 97.6 (12-17 @ 05:30), Max: 98.9 (12-17 @ 05:30)  HR: 96 (12-17 @ 05:30) (68 - 97)  BP: 123/71 (12-17 @ 05:30) (99/64 - 164/71)  BP(mean): --  RR: 18 (12-17 @ 05:30) (17 - 97)  SpO2: 97% (12-17 @ 05:30) (95% - 98%)    I&O's Summary      REVIEW OF SYSTEMS:    Negative except as mentioned in HPI    PHYSICAL EXAM:  NEURO: Awake , alert and oriented  GEN: Not in acute distress  NECK: No JVD  LUNGS: Clear to auscultation bilaterally   CARDIOVASCULAR: S1/S2 present, RRR , no murmurs or rubs, no carotid bruits,  + PP bilaterally  ABD: Soft, non-distended, +BS  EXT: No CARLOS A  SKIN: Intact    LABS:                        15.8   6.01  )-----------( 337      ( 16 Dec 2022 20:38 )             46.2     12-16    136  |  90<L>  |  31<H>  ----------------------------<  137<H>  4.0   |  28  |  2.2<H>    Ca    10.8<H>      16 Dec 2022 20:38    TPro  8.2<H>  /  Alb  5.1  /  TBili  0.6  /  DBili  x   /  AST  24  /  ALT  15  /  AlkPhos  83  12-16    PT/INR - ( 17 Dec 2022 01:57 )   PT: 11.20 sec;   INR: 0.98 ratio      PTT - ( 17 Dec 2022 01:57 )  PTT:26.7 sec    RADIOLOGY:  -CXR:    -TTE:    -CCTA:  < from: CT Abdomen and Pelvis w/ IV Cont (12.16.22 @ 23:32) >  IMPRESSION:    Penile pump with reservoir in the deep pelvis and tubing material   traveling through left inguinal canal. Small left inguinal hernia   containing short loop of collapsed bowel, with collapsed distal ileum   exiting. Proximal diffuse abnormal small bowel dilation, up to 3.8 cm   diameter. Findings compatible with small bowel obstruction to level of   left inguinal hernia.    < end of copied text >    -STRESS TEST:    -CATHETERIZATION:    ECG:    TELEMETRY EVENTS: N/A  
  MADISON CHAVEZ  [facility] [place] [address] ([facility] [place])      Patient is a 67y old  Male who presents with a chief complaint of SBO (17 Dec 2022 08:25)    HPI:  67M w/ PMHx of migraines, HTN, HLD, COPD not on home O2, Prostate Cancer s/p radiation and resection 4 years ago, recent replacement penile implant 4 weeks ago (Dr. Juarez, [facility]), H/O EtOH abuse quit in 2013, tobacco use quit in 2013, illicit drug use including cocaine, quit in 2005, presenting with 3 days of steady 5-6/10 abdominal pain associated w/ nausea and lightheadedness x 3 days. Since yesterday, patient stated he has started having episodes of NBNB emesis following even small meals and stopped having BMs. Patient stated he tried milk of magnesia and only had a very small BM. Patient states his last colonoscopy was 5 years ago and was significant for 2 noncancerous polyps which were excised. Patient endorses using cocaine, crack, and drinking alcohol but states he has not consumed these for several years.     Surgery was consulted for evaluation of possible SBO in setting of L inguinal hernia. CT scan was significant for severely dilated loops of small bowel with air fluid levels and a transition point near the L inguinal hernia. NGT was placed and 400cc fluid obtained upon insertion. Hernia was reduced in ED. Patient denies any other symptoms.    (17 Dec 2022 01:53)      Interval events:  Patient seen and examined at bedside. Says abdominal pain is slightly decreased. No fevers. Has NGT in place to suction, not much coming out.     -PMHx: HTN (hypertension)    Neuropathy    Dyslipidemia    Prostate CA    Emphysema lung    Arthritis      -PSHx:No significant past surgical history    FHx: No significant family history    Social Hx: Former cocaine/crack abuse, 17 years ago. Former EtOH abuse, 10 years ago. Former smoker, quit 10 years ago, 40 PY.         REVIEW OF SYSTEMS:  CONSTITUTIONAL: No fever, weight loss, or fatigue  RESPIRATORY: No cough, wheezing, chills or hemoptysis; No shortness of breath  CARDIOVASCULAR: No chest pain, palpitations, dizziness, or leg swelling  GASTROINTESTINAL: No abdominal or epigastric pain. No nausea, vomiting, or hematemesis; No diarrhea or constipation. No melena or hematochezia.  NEUROLOGICAL: No headaches  LYMPH NODES: No enlarged glands  MUSCULOSKELETAL: No joint pain or swelling; No muscle, back, or extremity pain      T(C): , Max: 37.2 (12-17-22 @ 05:30)  HR: 98 (12-17-22 @ 09:00)  BP: 130/87 (12-17-22 @ 09:00)  RR: 18 (12-17-22 @ 09:00)  SpO2: 100% (12-17-22 @ 09:00)  CAPILLARY BLOOD GLUCOSE          PHYSICAL EXAM:  NEURO: Awake , alert and oriented  GEN: Not in acute distress; NGT to suction   NECK: No JVD  LUNGS: Clear to auscultation bilaterally   CARDIOVASCULAR: S1/S2 present, RRR , no murmurs or rubs, no carotid bruits,  + PP bilaterally  ABD: Soft, non-distended, +BS  EXT: No CARLOS A  SKIN: Intact        LABS:          13.8  2.84  )-------(291          40.3  N=52.0  L=25.0  MCV=87.4          15.8  6.01  )-------(337          46.2  N=40.9  L=20.0  MCV=87.5    136|93<L>|33<H>  ------------------<137<H>  4.2|29|1.5  eGFR:--  Ca:9.4  136|90<L>|31<H>  ------------------<137<H>  4.0|28|2.2<H>  eGFR:--  Ca:10.8<H>      PT/INR - ( 17 Dec 2022 01:57 )   PT: 11.20 sec;   INR: 0.98 ratio         PTT - ( 17 Dec 2022 01:57 )  PTT:26.7 sec      Microbiology:      RADIOLOGY & ADDITIONAL TESTS:  < from: Xray Abdomen Minimum 3 Views (12.17.22 @ 08:58) >  FINDINGS/  IMPRESSION:    There is persistent dilatation of small bowel with differential air-fluid   levels indicative of obstruction.    There has been interval placement of enteric tube with side port   projecting over the stomach. No free air is evident. Excreted contrast is   noted in the urinary bladder.    < end of copied text >        Medications:  acetaminophen    Suspension .. 650 milliGRAM(s) Oral every 6 hours PRN  amLODIPine   Tablet 5 milliGRAM(s) Oral at bedtime  dextrose 5% + sodium chloride 0.9%. 1000 milliLiter(s) IV Continuous <Continuous>  finasteride 5 milliGRAM(s) Oral daily  gabapentin 600 milliGRAM(s) Oral three times a day  heparin   Injectable 5000 Unit(s) SubCutaneous every 8 hours  montelukast 10 milliGRAM(s) Oral daily  pantoprazole  Injectable 40 milliGRAM(s) IV Push daily  tamsulosin 0.4 milliGRAM(s) Oral at bedtime

## 2022-12-17 NOTE — CONSULT NOTE ADULT - ATTENDING COMMENTS
Patient can carry groceries up a flight of stairs without difficulty. ECG with normal sinus rhythm. No murmurs on exam.     Low risk for perioperative major adverse cardiac events    No further cardiac workup is indicated at this time. There are no current cardiac contraindications - MI within 60 days or unstable angina, decompensated heart failure, unstable arrhythmias, or hemodynamically significant valvular disease - that prohibit proceeding with the scheduled surgery/procedure. This consult serves only as a perioperative cardiac risk stratification and evaluation to predict 30-day cardiac complications risk and mortality. The decision to proceed with the surgery/procedure is made by the performing physician and the patient.     Cardiology consult team to sign off    Please contact me with any questions or concerns.    Darline Trinh MD  Office: [phone]  Mobile: [phone] Patient can carry groceries up a flight of stairs without difficulty. ECG with normal sinus rhythm. Cardiac exam with tachycardia, regular rhythm, no murmurs.     Low risk for perioperative major adverse cardiac events    No further cardiac workup is indicated at this time. There are no current cardiac contraindications - MI within 60 days or unstable angina, decompensated heart failure, unstable arrhythmias, or hemodynamically significant valvular disease - that prohibit proceeding with the scheduled surgery/procedure. This consult serves only as a perioperative cardiac risk stratification and evaluation to predict 30-day cardiac complications risk and mortality. The decision to proceed with the surgery/procedure is made by the performing physician and the patient.     Recommendations:   - For sinus tachycardia, please ensure patient receives IVF while NPO and that his pain is controlled  - Cardiology consult team to sign off    Please contact me with any questions or concerns.    Darline Trinh MD  Office: [phone]  Mobile: [phone]

## 2022-12-17 NOTE — CONSULT NOTE ADULT - ASSESSMENT
67M w/ PMHx of migraines, HTN, HLD, COPD not on home O2, Prostate Cancer s/p radiation and resection 4 years ago, recent replacement penile implant 4 weeks ago (Dr. Juarez, [facility]), H/O EtOH abuse quit in 2013, tobacco use quit in 2013, illicit drug use including cocaine, quit in 2005, presenting with 3 days of steady 5-6/10 abdominal pain associated w/ nausea and lightheadedness x 3 days. Found to have SBO associated w/ L inguinal hernia.     #SBO associated w/ L inguinal hernia  -per surgery, plan is to decompress and resolve obstruction prior to repair of inguinal hernia  -c/w NGT to suction  -NPO/bowel rest  -serial abd exams   -rest of plan per surgery    #H/o COPD not on O2  -not in exacerbation  -patient only on montelukast here  -no exacerbations in last year, minimally symptomatic, GOLD criteria group A   -start albuterol inhaler prn   -pulm consulted, f/u recs     #HTN  -c/w norvasc    #H/o prostate cancer s/p radiation   #H/o penile implant   -c/w flomax, proscar    #HLD  -check lipid panel     #OLY on admission  -likely prerenal  -c/w ivf   -trend cr       Planned Procedure: Inguinal hernia repair     Does the patient have the following conditions? Type Y or N    _N___DVT/PE           	  Currently anticoagulated ______ IVC Filter _______ Date:______    __N__DM                             Controlled    Y _______ N _______    __Y__HTN	                              Controlled    Y __Y_____ N _______    _N___CAD	                                  Prior MI  _____CABG _____STENT  ______ EF _____    _N___CHF                             Chronic _____ Acute _____ EF ______    _N___Afib	                                  Current Rhythm _________   Current anticoagulation _________    _N___PPM/ICD                         Indication ___________  last Interrogated _________    _N___OSA	                              PAP  Type &Settings _____________    __Y__Asthma/COPD	          Last Exac ___N/A____ Last Steroid use Date ____N/A___     __N__O2 dependent	          Reason ______________    ___Y_CKD or OLY	                  Stable Y __Y___  N ______    _N___Electrolyte Abn	          Type ___________     Stable Y ______   N _______    _N___Cirrhosis	                  Cause __________     Stable Y ______   N _______    _N___GI Bleed                          Cause __________     Stable Y ______   N _______    _N___Anemia	                          Cause __________     Stable Y ______   N _______    __N__Transfusion                  Last date ________      __Y__ETOH Use	                  Last date___2013_____     Intervention __________________________    _Y___Tobacco Abuse	          Last date ___2013_____    Intervention __________________________    __Y__Drug Use	                 Type____COCAINE________  Last dose _2005____ Intervention______________    ____Other                             Details_________________________________________________    Allergies    dust (Unknown)  No Known Drug Allergies    Intolerances      Duke Activity Status Index: Can the patient climb a flight of stairs or walk uphill?   ______ N   <4 METS   ___Y____ Y > 4 METS    Risk Assessment: (Use One)          Revised Cardiac Risk Index:   1 point. Class II risk. 6.0 % 30-day risk of death, MI, or cardiac arrest.       ____Y____  The patient is optimized for surgery/procedure and may proceed to the operating room as scheduled    _________The patient is NOT optimized for surgery/procedure and should not proceed to the operating room as scheduled          This consult serves only as a watson-operative medical risk stratification and evaluation to predict medical complications risk and mortality. The decision to proceed with the surgery/procedure is made by the performing physician and the patient. If any acute changes happen prior to surgery, this medical clearance is void and will need a new medical clearance.

## 2022-12-17 NOTE — CONSULT NOTE ADULT - TIME BILLING
Total time spent to complete patient's bedside assessment, physical examination, review medical chart including labs & imaging, discuss medical plan of care with housestaff was more than 55 minutes.

## 2022-12-17 NOTE — CONSULT NOTE ADULT - ASSESSMENT
* Patient-based characteristics (Functional capacity)  Patient is able to achieve more than 4 MET (walk 4 blocks, climb 2 flights of stairs, etc...)          Y [X] / N []    High-risk patient features:  - Recent (<30 days) or active MI          Y [] / N [X]  - Unstable or severe angina          Y [] / N [X]  - Decompensated heart failure, or worsening or new-onset heart failure          Y [] / N [X]  - Severe valvular disease          Y [] / N [X]  - Significant arrhythmia (Tachy- or Bradyarrhythmia)          Y [] / N [X]    * Surgery/Procedure-based characteristics (Type of surgery)  - Low-risk procedure (outpatient procedure, elective, endoscopy, etc...)          Y [] / N []  - Elevated or Moderate-risk procedure (Inpatient)          Y [x] / N []  - High-risk procedure (urgent/emergent procedure, Intrathoracic, vascular, etc...)          Y [] / N []    * Revised Cardiac Risk Index (RCRI)  1- History of ischemic heart disease          Y [] / N [X]  2- History of congestive heart failure          Y [] / N [X]  3- History of stroke/TIA          Y [] / N [X]  4- History of insulin-dependent diabetes          Y [] / N [X]  5- Chronic kidney disease (Cr >2mg/dL)          Y [] / N [X]  6- Undergoing suprainguinal vascular, intraperitoneal, or intrathoracic surgery          Y [x] / N []    Class I risk (Zero factors) --> 3.9% (30-day risk of death, MI, or cardiac arrest)    * IMPRESSION & RECOMMENDATIONS:  -please obtain an EKG  -Notwithstanding unknown presence of significant arrhythmia on EKG the patient is low -risk patient for a Moderate-risk surgery/procedure given physical exam and remainder of pre-operative testing.  - This consult serves only as a watson-operative cardiac risk stratification and evaluation to predict 30-days cardiac complications risk and mortality. The decision to proceed with the surgery/procedure is made by the performing physician and the patient  * Patient-based characteristics (Functional capacity)  Patient is able to achieve more than 4 MET (walk 4 blocks, climb 2 flights of stairs, etc...)          Y [X] / N []    High-risk patient features:  - Recent (<30 days) or active MI          Y [] / N [X]  - Unstable or severe angina          Y [] / N [X]  - Decompensated heart failure, or worsening or new-onset heart failure          Y [] / N [X]  - Severe valvular disease          Y [] / N [X]  - Significant arrhythmia (Tachy- or Bradyarrhythmia)          Y [] / N [X]    * Surgery/Procedure-based characteristics (Type of surgery)  - Low-risk procedure (outpatient procedure, elective, endoscopy, etc...)          Y [] / N []  - Elevated or Moderate-risk procedure (Inpatient)          Y [x] / N []  - High-risk procedure (urgent/emergent procedure, Intrathoracic, vascular, etc...)          Y [] / N []    * Revised Cardiac Risk Index (RCRI)  1- History of ischemic heart disease          Y [] / N [X]  2- History of congestive heart failure          Y [] / N [X]  3- History of stroke/TIA          Y [] / N [X]  4- History of insulin-dependent diabetes          Y [] / N [X]  5- Chronic kidney disease (Cr >2mg/dL)          Y [] / N [X]  6- Undergoing suprainguinal vascular, intraperitoneal, or intrathoracic surgery          Y [x] / N []    Class I risk (Zero factors) --> 3.9% (30-day risk of death, MI, or cardiac arrest)    * IMPRESSION & RECOMMENDATIONS:  -please obtain an EKG  - low -risk patient for a Moderate-risk surgery/procedure given physical exam and remainder of pre-operative testing.  -This consult serves only as a watson-operative cardiac risk stratification and evaluation to predict 30-days cardiac complications risk and mortality. The decision to proceed with the surgery/procedure is made by the performing physician and the patient

## 2022-12-17 NOTE — H&P ADULT - HISTORY OF PRESENT ILLNESS
67M w/ PMHx of migraines, HTN, HLD, COPD not on home O2, Prostate Cancer s/p radiation and resection 4 years ago, recent replacement penile implant 4 weeks ago (Dr. Juarez, [facility]) presenting with 3 days of steady 5-6/10 abdominal pain associated w/ nausea and lightheadedness x 3 days. Since yesterday, patient stated he has started having episodes of NBNB emesis following even small meals and stopped having BMs. Patient stated he tried milk of magnesia and only had a very small BM. Patient states his last colonoscopy was 5 years ago and was significant for 2 noncancerous polyps which were excised. Patient endorses using cocaine, crack, and drinking alcohol but states he has not consumed these for several years.     Surgery was consulted for evaluation of possible SBO in setting of L inguinal hernia. CT scan was significant for severely dilated loops of small bowel with air fluid levels and a transition point near the L inguinal hernia. NGT was placed and 400cc fluid obtained upon insertion. Hernia was reduced in ED. Patient denies any other symptoms.    67M w/ PMHx of migraines, HTN, HLD, COPD not on home O2, Prostate Cancer s/p radiation and resection 4 years ago, recent replacement penile implant 4 weeks ago (Dr. Juarez, [facility]), H/O EtOH abuse quit in 2013, tobacco use quit in 2013, illicit drug use including cocaine, quit in 2005, presenting with 3 days of steady 5-6/10 abdominal pain associated w/ nausea and lightheadedness x 3 days. Since yesterday, patient stated he has started having episodes of NBNB emesis following even small meals and stopped having BMs. Patient stated he tried milk of magnesia and only had a very small BM. Patient states his last colonoscopy was 5 years ago and was significant for 2 noncancerous polyps which were excised. Patient endorses using cocaine, crack, and drinking alcohol but states he has not consumed these for several years.     Surgery was consulted for evaluation of possible SBO in setting of L inguinal hernia. CT scan was significant for severely dilated loops of small bowel with air fluid levels and a transition point near the L inguinal hernia. NGT was placed and 400cc fluid obtained upon insertion. Hernia was reduced in ED. Patient denies any other symptoms.

## 2022-12-18 LAB
ANION GAP SERPL CALC-SCNC: 11 MMOL/L — SIGNIFICANT CHANGE UP (ref 7–14)
BASOPHILS # BLD AUTO: 0.04 K/UL — SIGNIFICANT CHANGE UP (ref 0–0.2)
BASOPHILS NFR BLD AUTO: 0.8 % — SIGNIFICANT CHANGE UP (ref 0–1)
BUN SERPL-MCNC: 13 MG/DL — SIGNIFICANT CHANGE UP (ref 10–20)
CALCIUM SERPL-MCNC: 8.6 MG/DL — SIGNIFICANT CHANGE UP (ref 8.4–10.5)
CHLORIDE SERPL-SCNC: 98 MMOL/L — SIGNIFICANT CHANGE UP (ref 98–110)
CO2 SERPL-SCNC: 30 MMOL/L — SIGNIFICANT CHANGE UP (ref 17–32)
CREAT SERPL-MCNC: 0.7 MG/DL — SIGNIFICANT CHANGE UP (ref 0.7–1.5)
CULTURE RESULTS: SIGNIFICANT CHANGE UP
EGFR: 102 ML/MIN/1.73M2 — SIGNIFICANT CHANGE UP
EOSINOPHIL # BLD AUTO: 0.06 K/UL — SIGNIFICANT CHANGE UP (ref 0–0.7)
EOSINOPHIL NFR BLD AUTO: 1.1 % — SIGNIFICANT CHANGE UP (ref 0–8)
GLUCOSE SERPL-MCNC: 101 MG/DL — HIGH (ref 70–99)
HCT VFR BLD CALC: 38.4 % — LOW (ref 42–52)
HGB BLD-MCNC: 12.7 G/DL — LOW (ref 14–18)
IMM GRANULOCYTES NFR BLD AUTO: 0.8 % — HIGH (ref 0.1–0.3)
LYMPHOCYTES # BLD AUTO: 1.07 K/UL — LOW (ref 1.2–3.4)
LYMPHOCYTES # BLD AUTO: 20.5 % — SIGNIFICANT CHANGE UP (ref 20.5–51.1)
MAGNESIUM SERPL-MCNC: 2.1 MG/DL — SIGNIFICANT CHANGE UP (ref 1.8–2.4)
MCHC RBC-ENTMCNC: 29.5 PG — SIGNIFICANT CHANGE UP (ref 27–31)
MCHC RBC-ENTMCNC: 33.1 G/DL — SIGNIFICANT CHANGE UP (ref 32–37)
MCV RBC AUTO: 89.1 FL — SIGNIFICANT CHANGE UP (ref 80–94)
MONOCYTES # BLD AUTO: 1.03 K/UL — HIGH (ref 0.1–0.6)
MONOCYTES NFR BLD AUTO: 19.7 % — HIGH (ref 1.7–9.3)
NEUTROPHILS # BLD AUTO: 2.98 K/UL — SIGNIFICANT CHANGE UP (ref 1.4–6.5)
NEUTROPHILS NFR BLD AUTO: 57.1 % — SIGNIFICANT CHANGE UP (ref 42.2–75.2)
NRBC # BLD: 0 /100 WBCS — SIGNIFICANT CHANGE UP (ref 0–0)
PHOSPHATE SERPL-MCNC: 1.4 MG/DL — LOW (ref 2.1–4.9)
PLATELET # BLD AUTO: 257 K/UL — SIGNIFICANT CHANGE UP (ref 130–400)
POTASSIUM SERPL-MCNC: 3.3 MMOL/L — LOW (ref 3.5–5)
POTASSIUM SERPL-SCNC: 3.3 MMOL/L — LOW (ref 3.5–5)
RBC # BLD: 4.31 M/UL — LOW (ref 4.7–6.1)
RBC # FLD: 12.7 % — SIGNIFICANT CHANGE UP (ref 11.5–14.5)
SODIUM SERPL-SCNC: 139 MMOL/L — SIGNIFICANT CHANGE UP (ref 135–146)
SPECIMEN SOURCE: SIGNIFICANT CHANGE UP
WBC # BLD: 5.22 K/UL — SIGNIFICANT CHANGE UP (ref 4.8–10.8)
WBC # FLD AUTO: 5.22 K/UL — SIGNIFICANT CHANGE UP (ref 4.8–10.8)

## 2022-12-18 PROCEDURE — 71045 X-RAY EXAM CHEST 1 VIEW: CPT | Mod: 26

## 2022-12-18 PROCEDURE — 99232 SBSQ HOSP IP/OBS MODERATE 35: CPT | Mod: 57

## 2022-12-18 RX ORDER — GABAPENTIN 400 MG/1
600 CAPSULE ORAL THREE TIMES A DAY
Refills: 0 | Status: DISCONTINUED | OUTPATIENT
Start: 2022-12-18 | End: 2022-12-22

## 2022-12-18 RX ADMIN — HEPARIN SODIUM 5000 UNIT(S): 5000 INJECTION INTRAVENOUS; SUBCUTANEOUS at 05:17

## 2022-12-18 RX ADMIN — HEPARIN SODIUM 5000 UNIT(S): 5000 INJECTION INTRAVENOUS; SUBCUTANEOUS at 14:06

## 2022-12-18 RX ADMIN — GABAPENTIN 600 MILLIGRAM(S): 400 CAPSULE ORAL at 22:22

## 2022-12-18 RX ADMIN — SODIUM CHLORIDE 125 MILLILITER(S): 9 INJECTION, SOLUTION INTRAVENOUS at 04:11

## 2022-12-18 RX ADMIN — Medication 85 MILLIMOLE(S): at 04:11

## 2022-12-18 RX ADMIN — PANTOPRAZOLE SODIUM 40 MILLIGRAM(S): 20 TABLET, DELAYED RELEASE ORAL at 12:34

## 2022-12-18 RX ADMIN — HEPARIN SODIUM 5000 UNIT(S): 5000 INJECTION INTRAVENOUS; SUBCUTANEOUS at 21:24

## 2022-12-18 RX ADMIN — AMLODIPINE BESYLATE 5 MILLIGRAM(S): 2.5 TABLET ORAL at 21:25

## 2022-12-18 RX ADMIN — MONTELUKAST 10 MILLIGRAM(S): 4 TABLET, CHEWABLE ORAL at 12:34

## 2022-12-18 NOTE — PROGRESS NOTE ADULT - SUBJECTIVE AND OBJECTIVE BOX
GENERAL SURGERY PROGRESS NOTE    Patient: MADISON CHAVEZ , 67y (57)Male   MRN: [id number]  Location: [address]  Visit: 22 Inpatient  Date: 22 @ 11:12    Procedure/Dx/Injuries: SBO with transition point in L inguinal herna    Events of past 24 hours: Patient removed NGT this morning, was replaced successfully without complications. Denies N/V, and is passing flatus and having bowel movements. Patient is voiding. Cardiology said low risk, medicine risk stratification pending. Patient to be transferred to [facility] .     PAST MEDICAL & SURGICAL HISTORY:  HTN (hypertension)      Neuropathy      Dyslipidemia      Prostate CA  for 12 years      Emphysema lung      Arthritis      No significant past surgical history          Vitals:   T(F): 98.1 (22 @ 09:43), Max: 99.3 (22 @ 01:38)  HR: 103 (22 @ 09:43)  BP: 154/88 (22 @ 09:43)  RR: 18 (22 @ 09:43)  SpO2: 100% (22 @ 09:43)      Diet, NPO after Midnight:      NPO Start Date: 18-Dec-2022,   NPO Start Time: 23:59  Diet, NPO:   Except Medications      Fluids:     I & O's:    22 @ 07:01  -  22 @ 07:00  --------------------------------------------------------  IN:    dextrose 5% + sodium chloride 0.9%: 375 mL  Total IN: 375 mL    OUT:    Voided (mL): 750 mL  Total OUT: 750 mL    Total NET: -375 mL      PHYSICAL EXAM:  NEURO: Awake , alert and oriented  GEN: Not in acute distress  NECK: No JVD  LUNGS: Clear to auscultation bilaterally   CARDIOVASCULAR: S1/S2 present, RRR , no murmurs or rubs, no carotid bruits,  + PP bilaterally  ABD: Soft, non-distended, +BS  EXT: No CARLOS A  SKIN: Intact        MEDICATIONS  (STANDING):  amLODIPine   Tablet 5 milliGRAM(s) Oral at bedtime  dextrose 5% + sodium chloride 0.9%. 1000 milliLiter(s) (125 mL/Hr) IV Continuous <Continuous>  finasteride 5 milliGRAM(s) Oral daily  gabapentin 600 milliGRAM(s) Oral three times a day  heparin   Injectable 5000 Unit(s) SubCutaneous every 8 hours  montelukast 10 milliGRAM(s) Oral daily  pantoprazole  Injectable 40 milliGRAM(s) IV Push daily  tamsulosin 0.4 milliGRAM(s) Oral at bedtime    MEDICATIONS  (PRN):  acetaminophen    Suspension .. 650 milliGRAM(s) Oral every 6 hours PRN Moderate Pain (4 - 6)      DVT PROPHYLAXIS: heparin   Injectable 5000 Unit(s) SubCutaneous every 8 hours    GI PROPHYLAXIS: pantoprazole  Injectable 40 milliGRAM(s) IV Push daily    ANTICOAGULATION:   ANTIBIOTICS:            LAB/STUDIES:  Labs:  CAPILLARY BLOOD GLUCOSE                              13.7   2.15  )-----------( 274      ( 17 Dec 2022 20:58 )             41.2       Auto Neutrophil %: 47.8 % (22 @ 20:58)  Auto Immature Granulocyte %: 0.5 % (22 @ 20:58)        131<L>  |  93<L>  |  26<H>  ----------------------------<  127<H>  4.7   |  29  |  0.9      Calcium, Total Serum: 9.1 mg/dL (22 @ 20:58)      LFTs:             8.2  | 0.6  | 24       ------------------[83      ( 16 Dec 2022 20:38 )  5.1  | x    | 15          Lipase:22     Amylase:x         Lactate, Blood: 1.3 mmol/L (22 @ 08:37)  Blood Gas Venous - Lactate: 2.00 mmol/L (22 @ 23:34)  Blood Gas Venous - Lactate: 2.50 mmol/L (22 @ 22:35)      Coags:     11.20  ----< 0.98    ( 17 Dec 2022 01:57 )     26.7                Urinalysis Basic - ( 16 Dec 2022 22:51 )    Color: Michelle / Appearance: Slightly Turbid / S.029 / pH: x  Gluc: x / Ketone: Trace  / Bili: Small / Urobili: 3 mg/dL   Blood: x / Protein: 100 mg/dL / Nitrite: Negative   Leuk Esterase: Negative / RBC: 10 /HPF / WBC >20 /HPF   Sq Epi: x / Non Sq Epi: 4 /HPF / Bacteria: Few        Culture - Urine (collected 16 Dec 2022 22:51)  Source: Clean Catch Clean Catch (Midstream)  Final Report (18 Dec 2022 08:02):    <10,000 CFU/mL Normal Urogenital Araseli    Culture - Blood (collected 16 Dec 2022 21:14)  Source: .Blood Blood-Peripheral  Preliminary Report (18 Dec 2022 03:02):    No growth to date.    Culture - Blood (collected 16 Dec 2022 21:14)  Source: .Blood Blood-Peripheral  Preliminary Report (18 Dec 2022 03:02):    No growth to date.

## 2022-12-18 NOTE — PROGRESS NOTE ADULT - ATTENDING COMMENTS
NGT replaced after accidental removal.   Planning [place] possibly [place]  No beds currently open to transfer today but will work with bed mgmt and transfer when able

## 2022-12-18 NOTE — PROGRESS NOTE ADULT - ASSESSMENT
67M w/ PMHx of migraines, HTN, HLD, COPD not on home O2, Prostate Cancer s/p radiation and resection 4 years ago, recent replacement penile implant 4 weeks ago presents w/ SBO associated w/ L inguinal hernia.    Plan:  - Admit to surgical service under Dr. Milligan  - NPO/IVF  - Hold home PO meds until able to tolerate PO  - NGT to LCWS, monitor output, monitor lytes and replete PRN, f/u CXR to placement of NGT  - Plan for OR early this week for robotic possible laparoscopic left inguinal hernia repair at [facility]  - Serial abdominal exams q4hrs  - Monitor for return of bowel function  - Low threshold to take patient to OR sooner if   - GI/DVT ppx  - hemodynamic monitoring/vital signs q4h  - Strict Is and Os q4h  - Pain control  - activity as tolerated  - Imaging: AM obstructive series  - incentive spirometer  - aspiration precautions

## 2022-12-19 ENCOUNTER — TRANSCRIPTION ENCOUNTER (OUTPATIENT)
Age: 67
End: 2022-12-19

## 2022-12-19 LAB
GLUCOSE BLDC GLUCOMTR-MCNC: 111 MG/DL — HIGH (ref 70–99)
SARS-COV-2 RNA SPEC QL NAA+PROBE: SIGNIFICANT CHANGE UP

## 2022-12-19 PROCEDURE — S2900 ROBOTIC SURGICAL SYSTEM: CPT | Mod: NC

## 2022-12-19 PROCEDURE — 53600 DILATE URETHRA STRICTURE: CPT

## 2022-12-19 PROCEDURE — 99232 SBSQ HOSP IP/OBS MODERATE 35: CPT | Mod: 57

## 2022-12-19 PROCEDURE — 49650 LAP ING HERNIA REPAIR INIT: CPT

## 2022-12-19 RX ORDER — ONDANSETRON 8 MG/1
4 TABLET, FILM COATED ORAL ONCE
Refills: 0 | Status: DISCONTINUED | OUTPATIENT
Start: 2022-12-19 | End: 2022-12-19

## 2022-12-19 RX ORDER — HYDROMORPHONE HYDROCHLORIDE 2 MG/ML
1 INJECTION INTRAMUSCULAR; INTRAVENOUS; SUBCUTANEOUS
Refills: 0 | Status: DISCONTINUED | OUTPATIENT
Start: 2022-12-19 | End: 2022-12-19

## 2022-12-19 RX ORDER — SODIUM CHLORIDE 9 MG/ML
1000 INJECTION, SOLUTION INTRAVENOUS
Refills: 0 | Status: DISCONTINUED | OUTPATIENT
Start: 2022-12-19 | End: 2022-12-19

## 2022-12-19 RX ORDER — HYDROMORPHONE HYDROCHLORIDE 2 MG/ML
0.5 INJECTION INTRAMUSCULAR; INTRAVENOUS; SUBCUTANEOUS
Refills: 0 | Status: DISCONTINUED | OUTPATIENT
Start: 2022-12-19 | End: 2022-12-19

## 2022-12-19 RX ORDER — METOCLOPRAMIDE HCL 10 MG
10 TABLET ORAL ONCE
Refills: 0 | Status: DISCONTINUED | OUTPATIENT
Start: 2022-12-19 | End: 2022-12-19

## 2022-12-19 RX ORDER — MEPERIDINE HYDROCHLORIDE 50 MG/ML
12.5 INJECTION INTRAMUSCULAR; INTRAVENOUS; SUBCUTANEOUS
Refills: 0 | Status: DISCONTINUED | OUTPATIENT
Start: 2022-12-19 | End: 2022-12-19

## 2022-12-19 RX ORDER — INFLUENZA VIRUS VACCINE 15; 15; 15; 15 UG/.5ML; UG/.5ML; UG/.5ML; UG/.5ML
0.7 SUSPENSION INTRAMUSCULAR ONCE
Refills: 0 | Status: COMPLETED | OUTPATIENT
Start: 2022-12-19 | End: 2022-12-19

## 2022-12-19 RX ADMIN — GABAPENTIN 600 MILLIGRAM(S): 400 CAPSULE ORAL at 23:49

## 2022-12-19 RX ADMIN — Medication 650 MILLIGRAM(S): at 04:24

## 2022-12-19 RX ADMIN — HEPARIN SODIUM 5000 UNIT(S): 5000 INJECTION INTRAVENOUS; SUBCUTANEOUS at 21:18

## 2022-12-19 RX ADMIN — Medication 650 MILLIGRAM(S): at 03:00

## 2022-12-19 RX ADMIN — AMLODIPINE BESYLATE 5 MILLIGRAM(S): 2.5 TABLET ORAL at 21:18

## 2022-12-19 RX ADMIN — TAMSULOSIN HYDROCHLORIDE 0.4 MILLIGRAM(S): 0.4 CAPSULE ORAL at 21:19

## 2022-12-19 NOTE — PROVIDER CONTACT NOTE (OTHER) - SITUATION
Patient disconnected himself from NG tube suction to ambulate to bathroom despite RN education.
Patient removed NG tube

## 2022-12-19 NOTE — BRIEF OPERATIVE NOTE - COMMENTS
Surgical unable to pass urethral cath for Robotic hernia repair   urethra /BN dilated w/ coude sounds followed by 16 f Nikolai cath to leg

## 2022-12-19 NOTE — BRIEF OPERATIVE NOTE - NSICDXBRIEFPOSTOP_GEN_ALL_CORE_FT
POST-OP DIAGNOSIS:  Left inguinal hernia 19-Dec-2022 15:14:34  Osiel Trinh  
POST-OP DIAGNOSIS:  Bladder neck stricture 19-Dec-2022 14:36:27  Axel WU

## 2022-12-19 NOTE — BRIEF OPERATIVE NOTE - NSICDXBRIEFPREOP_GEN_ALL_CORE_FT
PRE-OP DIAGNOSIS:  Left inguinal hernia 19-Dec-2022 15:14:30  Osiel Trinh  
PRE-OP DIAGNOSIS:  Urinary retention 19-Dec-2022 14:36:08  Axel WU

## 2022-12-19 NOTE — BRIEF OPERATIVE NOTE - OPERATION/FINDINGS
Obstruction at level of BN
s/p robotic assisted repair of left inguinal hernia. Prior to operation; left groin hernia palpable and reduced. Left groin defect visualized without any herniated intraabdominal organs. Hernia likely due to recent placement of penile implant (with reservoir). Small bowel dilated. Preperitoneal flap created, small plug and patch mesh used at hernia site; and preperitoneal flap closed. Hemostasis achieved.

## 2022-12-19 NOTE — PROGRESS NOTE ADULT - ASSESSMENT
67M w/ PMHx of migraines, HTN, HLD, COPD not on home O2, Prostate Cancer s/p radiation and resection 4 years ago, recent replacement penile implant 4 weeks ago presents w/ SBO associated w/ L inguinal hernia.    Plan:  - NPO/IVF  - Hold home PO meds until able to tolerate PO  - NGT to LCWS, monitor output, monitor lytes and replete PRN  - Plan for OR today for robotic possible laparoscopic left inguinal hernia repair at [facility]  - Serial abdominal exams q4hrs  - Monitor for return of bowel function  - Low threshold to take patient to OR sooner if   - GI/DVT ppx  - hemodynamic monitoring/vital signs q4h  - Strict Is and Os q4h  - Pain control  - activity as tolerated  - Imaging: AM obstructive series  - incentive spirometer  - aspiration precautions    [phone]

## 2022-12-19 NOTE — BH PATIENT PROFILE - FALL HARM RISK - HARM RISK INTERVENTIONS

## 2022-12-19 NOTE — PROGRESS NOTE ADULT - SUBJECTIVE AND OBJECTIVE BOX
GENERAL SURGERY PROGRESS NOTE    Patient: MADISON CHAVEZ , 67y ([birth year])Male   MRN: [id number]  Location: [address]  Visit: 12-17-22 Inpatient  Date: 12-19-22 @ 04:05    Hospital Day #: 4    Procedure/Dx/Injuries: SBO w/ transition point in left inguinal hernia     Events of past 24 hours:  -episode of bright red blood per NGT, flushed and resolved     PAST MEDICAL & SURGICAL HISTORY:  HTN (hypertension)  Neuropathy  Dyslipidemia  Prostate CA  for 12 years  Emphysema lung  Arthritis  No significant past surgical history    Vitals:   T(F): 99.5 (12-19-22 @ 02:03), Max: 99.5 (12-19-22 @ 02:03)  HR: 98 (12-19-22 @ 02:03)  BP: 135/85 (12-19-22 @ 02:03)  RR: 18 (12-19-22 @ 02:03)  SpO2: 98% (12-19-22 @ 02:03)    Diet, NPO after Midnight:      NPO Start Date: 18-Dec-2022,   NPO Start Time: 23:59  Diet, NPO:   Except Medications    Fluids:     I & O's:    12-17-22 @ 07:01  -  12-18-22 @ 07:00  --------------------------------------------------------  IN:    dextrose 5% + sodium chloride 0.9%: 375 mL  Total IN: 375 mL    OUT:    Voided (mL): 750 mL  Total OUT: 750 mL    Total NET: -375 mL    PHYSICAL EXAM:  NEURO: Awake , alert and oriented  GEN: Not in acute distress  NECK: No JVD  LUNGS: Clear to auscultation bilaterally   CARDIOVASCULAR: S1/S2 present, RRR , no murmurs or rubs, no carotid bruits,  + PP bilaterally  ABD: Soft, non-distended, +BS  EXT: No CARLOS A  SKIN: Intact      MEDICATIONS  (STANDING):  amLODIPine   Tablet 5 milliGRAM(s) Oral at bedtime  dextrose 5% + sodium chloride 0.9%. 1000 milliLiter(s) (125 mL/Hr) IV Continuous <Continuous>  finasteride 5 milliGRAM(s) Oral daily  gabapentin Solution 600 milliGRAM(s) Oral three times a day  heparin   Injectable 5000 Unit(s) SubCutaneous every 8 hours  montelukast 10 milliGRAM(s) Oral daily  pantoprazole  Injectable 40 milliGRAM(s) IV Push daily  tamsulosin 0.4 milliGRAM(s) Oral at bedtime    MEDICATIONS  (PRN):  acetaminophen    Suspension .. 650 milliGRAM(s) Oral every 6 hours PRN Moderate Pain (4 - 6)    DVT PROPHYLAXIS: heparin   Injectable 5000 Unit(s) SubCutaneous every 8 hours    GI PROPHYLAXIS: pantoprazole  Injectable 40 milliGRAM(s) IV Push daily    ANTICOAGULATION:   ANTIBIOTICS:      LAB/STUDIES:  Labs:  CAPILLARY BLOOD GLUCOSE               12.7   5.22  )-----------( 257      ( 18 Dec 2022 22:10 )             38.4       Auto Neutrophil %: 57.1 % (12-18-22 @ 22:10)  Auto Immature Granulocyte %: 0.8 % (12-18-22 @ 22:10)    12-18    139  |  98  |  13  ----------------------------<  101<H>  3.3<L>   |  30  |  0.7    Calcium, Total Serum: 8.6 mg/dL (12-18-22 @ 22:10)    LFTs:     Lactate, Blood: 1.3 mmol/L (12-17-22 @ 08:37)  Blood Gas Venous - Lactate: 2.00 mmol/L (12-16-22 @ 23:34)  Blood Gas Venous - Lactate: 2.50 mmol/L (12-16-22 @ 22:35)    Coags:    Culture - Urine (collected 16 Dec 2022 22:51)  Source: Clean Catch Clean Catch (Midstream)  Final Report (18 Dec 2022 08:02):    <10,000 CFU/mL Normal Urogenital Araseli    Culture - Blood (collected 16 Dec 2022 21:14)  Source: .Blood Blood-Peripheral  Preliminary Report (18 Dec 2022 03:02):    No growth to date.    Culture - Blood (collected 16 Dec 2022 21:14)  Source: .Blood Blood-Peripheral  Preliminary Report (18 Dec 2022 03:02):    No growth to date.

## 2022-12-19 NOTE — BH PATIENT PROFILE - HOME MEDICATIONS
montelukast 10 mg oral tablet , 1 tab(s) orally once a day  amLODIPine 5 mg oral tablet , 1 tab(s) orally once a day  finasteride 5 mg oral tablet , 1 tab(s) orally once a day  Ambien , 12.5 milligram(s) orally once a day (at bedtime), As Needed  gabapentin 600 mg oral tablet , 1 tab(s) orally 3 times a day  tamsulosin 0.4 mg oral capsule , 1 cap(s) orally once a day  famotidine 20 mg oral tablet , 1 tab(s) orally once a day, As Needed

## 2022-12-19 NOTE — PROVIDER CONTACT NOTE (OTHER) - ACTION/TREATMENT ORDERED:
MD Datan aware. Suction turned off. MD at bedside to assess. Flushed NG and resolved. NG LCS resumed per MD.

## 2022-12-19 NOTE — PROGRESS NOTE ADULT - ATTENDING COMMENTS
Pt stable; has bowel fxn, though NG still with output. Hernia remains reduced and abdomen firm but nontender. Will plan for diagnostic laparoscopy today and likely robotic LIH repair with mesh. II have also discussed case with Dr. Otero of Urology in lieu of recent penile implant placement and location of tube/reservoir on CT.

## 2022-12-20 PROCEDURE — 74018 RADEX ABDOMEN 1 VIEW: CPT | Mod: 26

## 2022-12-20 PROCEDURE — 74177 CT ABD & PELVIS W/CONTRAST: CPT | Mod: 26

## 2022-12-20 PROCEDURE — 99024 POSTOP FOLLOW-UP VISIT: CPT

## 2022-12-20 RX ORDER — POTASSIUM PHOSPHATE, MONOBASIC POTASSIUM PHOSPHATE, DIBASIC 236; 224 MG/ML; MG/ML
30 INJECTION, SOLUTION INTRAVENOUS ONCE
Refills: 0 | Status: COMPLETED | OUTPATIENT
Start: 2022-12-20 | End: 2022-12-20

## 2022-12-20 RX ORDER — DEXTROSE MONOHYDRATE, SODIUM CHLORIDE, AND POTASSIUM CHLORIDE 50; .745; 4.5 G/1000ML; G/1000ML; G/1000ML
1000 INJECTION, SOLUTION INTRAVENOUS
Refills: 0 | Status: DISCONTINUED | OUTPATIENT
Start: 2022-12-20 | End: 2022-12-22

## 2022-12-20 RX ORDER — DIATRIZOATE MEGLUMINE 180 MG/ML
30 INJECTION, SOLUTION INTRAVESICAL ONCE
Refills: 0 | Status: COMPLETED | OUTPATIENT
Start: 2022-12-20 | End: 2022-12-20

## 2022-12-20 RX ADMIN — Medication 650 MILLIGRAM(S): at 07:40

## 2022-12-20 RX ADMIN — SODIUM CHLORIDE 125 MILLILITER(S): 9 INJECTION, SOLUTION INTRAVENOUS at 00:51

## 2022-12-20 RX ADMIN — GABAPENTIN 600 MILLIGRAM(S): 400 CAPSULE ORAL at 16:36

## 2022-12-20 RX ADMIN — HEPARIN SODIUM 5000 UNIT(S): 5000 INJECTION INTRAVENOUS; SUBCUTANEOUS at 07:11

## 2022-12-20 RX ADMIN — PANTOPRAZOLE SODIUM 40 MILLIGRAM(S): 20 TABLET, DELAYED RELEASE ORAL at 16:36

## 2022-12-20 RX ADMIN — DEXTROSE MONOHYDRATE, SODIUM CHLORIDE, AND POTASSIUM CHLORIDE 75 MILLILITER(S): 50; .745; 4.5 INJECTION, SOLUTION INTRAVENOUS at 18:18

## 2022-12-20 RX ADMIN — HEPARIN SODIUM 5000 UNIT(S): 5000 INJECTION INTRAVENOUS; SUBCUTANEOUS at 16:35

## 2022-12-20 RX ADMIN — GABAPENTIN 600 MILLIGRAM(S): 400 CAPSULE ORAL at 07:10

## 2022-12-20 RX ADMIN — HEPARIN SODIUM 5000 UNIT(S): 5000 INJECTION INTRAVENOUS; SUBCUTANEOUS at 21:23

## 2022-12-20 RX ADMIN — AMLODIPINE BESYLATE 5 MILLIGRAM(S): 2.5 TABLET ORAL at 21:18

## 2022-12-20 RX ADMIN — GABAPENTIN 600 MILLIGRAM(S): 400 CAPSULE ORAL at 21:24

## 2022-12-20 RX ADMIN — TAMSULOSIN HYDROCHLORIDE 0.4 MILLIGRAM(S): 0.4 CAPSULE ORAL at 21:17

## 2022-12-20 RX ADMIN — DIATRIZOATE MEGLUMINE 30 MILLILITER(S): 180 INJECTION, SOLUTION INTRAVESICAL at 10:26

## 2022-12-20 RX ADMIN — POTASSIUM PHOSPHATE, MONOBASIC POTASSIUM PHOSPHATE, DIBASIC 83.33 MILLIMOLE(S): 236; 224 INJECTION, SOLUTION INTRAVENOUS at 16:44

## 2022-12-20 NOTE — CHART NOTE - NSCHARTNOTEFT_GEN_A_CORE
Vital Signs Last 24 Hrs  T(C): 37.4 (19 Dec 2022 22:29), Max: 37.5 (19 Dec 2022 02:03)  T(F): 99.3 (19 Dec 2022 22:29), Max: 99.5 (19 Dec 2022 02:03)  HR: 84 (19 Dec 2022 22:29) (79 - 120)  BP: 152/93 (19 Dec 2022 22:29) (134/80 - 169/107)  BP(mean): --  RR: 16 (19 Dec 2022 22:29) (16 - 22)  SpO2: 98% (19 Dec 2022 16:54) (97% - 100%)  Parameters below as of 19 Dec 2022 15:09  Patient On (Oxygen Delivery Method): room air  Pt is S/p Left Inguinal hernia repair.  pt without complaints.  NGT in place. Beck BSD  Abdo: HypoBS, soft, post-op tenderness.  Incisions C/D/I.   - cont current mangement.  - NGT   - Beck BSD

## 2022-12-20 NOTE — CHART NOTE - NSCHARTNOTEFT_GEN_A_CORE
CT Abdomen and Pelvis w/ Oral Cont and w/ IV Cont (12.20.22 @ 13:07) >    IMPRESSION:  1.  Since December 16, 2022, postsurgical changes in the abdominal wall   and left inguinal canal from recent left inguinal hernia repair.    2.  Diffuse mildly dilated, fluid-filled colonic and small bowel loops   seen throughout the abdomen with majority of the oral contrast currently   within the proximal small bowel. Findings most likely represent   postoperative ileus. A short-term follow-up CT abdomen pelvis can be   obtained in 6 hours to assess for contrast transit.    Findings were discussed with Dr. Milligan at 1:44PM on 12/20/2022  ======================================================  Above D/W Dr. Milligan:   - keep NGT/NPO  - repeat KUb at 7 pm tonight and in AM to see transition of oral contrast  - replete electrolytes

## 2022-12-20 NOTE — PROGRESS NOTE ADULT - SUBJECTIVE AND OBJECTIVE BOX
S; Pt doing well - c/o some  S; Pt doing well - c/o some groin pain  No nausea, vomiting  NG in place; min output    K 3.3

## 2022-12-21 LAB
ANION GAP SERPL CALC-SCNC: 9 MMOL/L — SIGNIFICANT CHANGE UP (ref 7–14)
BUN SERPL-MCNC: 12 MG/DL — SIGNIFICANT CHANGE UP (ref 10–20)
CALCIUM SERPL-MCNC: 8.4 MG/DL — SIGNIFICANT CHANGE UP (ref 8.4–10.5)
CHLORIDE SERPL-SCNC: 99 MMOL/L — SIGNIFICANT CHANGE UP (ref 98–110)
CO2 SERPL-SCNC: 31 MMOL/L — SIGNIFICANT CHANGE UP (ref 17–32)
CREAT SERPL-MCNC: 0.7 MG/DL — SIGNIFICANT CHANGE UP (ref 0.7–1.5)
EGFR: 102 ML/MIN/1.73M2 — SIGNIFICANT CHANGE UP
GLUCOSE SERPL-MCNC: 118 MG/DL — HIGH (ref 70–99)
HCT VFR BLD CALC: 36.8 % — LOW (ref 42–52)
HGB BLD-MCNC: 12.3 G/DL — LOW (ref 14–18)
MAGNESIUM SERPL-MCNC: 1.9 MG/DL — SIGNIFICANT CHANGE UP (ref 1.8–2.4)
MCHC RBC-ENTMCNC: 29.6 PG — SIGNIFICANT CHANGE UP (ref 27–31)
MCHC RBC-ENTMCNC: 33.4 G/DL — SIGNIFICANT CHANGE UP (ref 32–37)
MCV RBC AUTO: 88.5 FL — SIGNIFICANT CHANGE UP (ref 80–94)
NRBC # BLD: 0 /100 WBCS — SIGNIFICANT CHANGE UP (ref 0–0)
PHOSPHATE SERPL-MCNC: 1.5 MG/DL — LOW (ref 2.1–4.9)
PLATELET # BLD AUTO: 298 K/UL — SIGNIFICANT CHANGE UP (ref 130–400)
POTASSIUM SERPL-MCNC: 3.1 MMOL/L — LOW (ref 3.5–5)
POTASSIUM SERPL-SCNC: 3.1 MMOL/L — LOW (ref 3.5–5)
RBC # BLD: 4.16 M/UL — LOW (ref 4.7–6.1)
RBC # FLD: 12.8 % — SIGNIFICANT CHANGE UP (ref 11.5–14.5)
SODIUM SERPL-SCNC: 139 MMOL/L — SIGNIFICANT CHANGE UP (ref 135–146)
WBC # BLD: 9.35 K/UL — SIGNIFICANT CHANGE UP (ref 4.8–10.8)
WBC # FLD AUTO: 9.35 K/UL — SIGNIFICANT CHANGE UP (ref 4.8–10.8)

## 2022-12-21 PROCEDURE — 74018 RADEX ABDOMEN 1 VIEW: CPT | Mod: 26

## 2022-12-21 PROCEDURE — 99024 POSTOP FOLLOW-UP VISIT: CPT

## 2022-12-21 RX ORDER — ZOLPIDEM TARTRATE 10 MG/1
5 TABLET ORAL AT BEDTIME
Refills: 0 | Status: DISCONTINUED | OUTPATIENT
Start: 2022-12-21 | End: 2022-12-22

## 2022-12-21 RX ORDER — POTASSIUM PHOSPHATE, MONOBASIC POTASSIUM PHOSPHATE, DIBASIC 236; 224 MG/ML; MG/ML
30 INJECTION, SOLUTION INTRAVENOUS ONCE
Refills: 0 | Status: COMPLETED | OUTPATIENT
Start: 2022-12-21 | End: 2022-12-21

## 2022-12-21 RX ORDER — ZOLPIDEM TARTRATE 10 MG/1
5 TABLET ORAL ONCE
Refills: 0 | Status: DISCONTINUED | OUTPATIENT
Start: 2022-12-21 | End: 2022-12-21

## 2022-12-21 RX ADMIN — ZOLPIDEM TARTRATE 5 MILLIGRAM(S): 10 TABLET ORAL at 23:04

## 2022-12-21 RX ADMIN — HEPARIN SODIUM 5000 UNIT(S): 5000 INJECTION INTRAVENOUS; SUBCUTANEOUS at 05:30

## 2022-12-21 RX ADMIN — HEPARIN SODIUM 5000 UNIT(S): 5000 INJECTION INTRAVENOUS; SUBCUTANEOUS at 22:17

## 2022-12-21 RX ADMIN — HEPARIN SODIUM 5000 UNIT(S): 5000 INJECTION INTRAVENOUS; SUBCUTANEOUS at 16:10

## 2022-12-21 RX ADMIN — AMLODIPINE BESYLATE 5 MILLIGRAM(S): 2.5 TABLET ORAL at 22:17

## 2022-12-21 RX ADMIN — MONTELUKAST 10 MILLIGRAM(S): 4 TABLET, CHEWABLE ORAL at 12:11

## 2022-12-21 RX ADMIN — GABAPENTIN 600 MILLIGRAM(S): 400 CAPSULE ORAL at 22:54

## 2022-12-21 RX ADMIN — DEXTROSE MONOHYDRATE, SODIUM CHLORIDE, AND POTASSIUM CHLORIDE 75 MILLILITER(S): 50; .745; 4.5 INJECTION, SOLUTION INTRAVENOUS at 12:12

## 2022-12-21 RX ADMIN — GABAPENTIN 600 MILLIGRAM(S): 400 CAPSULE ORAL at 16:10

## 2022-12-21 RX ADMIN — POTASSIUM PHOSPHATE, MONOBASIC POTASSIUM PHOSPHATE, DIBASIC 83.33 MILLIMOLE(S): 236; 224 INJECTION, SOLUTION INTRAVENOUS at 23:30

## 2022-12-21 RX ADMIN — GABAPENTIN 600 MILLIGRAM(S): 400 CAPSULE ORAL at 05:29

## 2022-12-21 RX ADMIN — FINASTERIDE 5 MILLIGRAM(S): 5 TABLET, FILM COATED ORAL at 12:11

## 2022-12-21 RX ADMIN — TAMSULOSIN HYDROCHLORIDE 0.4 MILLIGRAM(S): 0.4 CAPSULE ORAL at 22:17

## 2022-12-21 RX ADMIN — PANTOPRAZOLE SODIUM 40 MILLIGRAM(S): 20 TABLET, DELAYED RELEASE ORAL at 12:11

## 2022-12-21 NOTE — PHYSICAL THERAPY INITIAL EVALUATION ADULT - ADDITIONAL COMMENTS
pt is 68 y/o F , lives with wife  in3 story  PH , information obtain from the pt himself , has 5-6 steps to enter with BL HR and 25  steps to the 3 rd level W/ LT HR  ,  As per pt he  was independent  with bed mobility , transfer , ambulation ,stair negotiation and basic ADLS.  occasionally use SPC for  the safety

## 2022-12-21 NOTE — PROGRESS NOTE ADULT - ATTENDING COMMENTS
Afebrile. CT reviewed; some fluid in the L groin though no bowel loops present / e/o recurrence. There is an apparent postop ileus. Pt was mobilized and OOB yest afternoon. Overnight, NG with minimal output. This morning, pt denies abdominal pain or groin pain. Passing several episodes of watery stool with flatus. The abdomen is softer than yest and appropriately tender; groin flat and nontender. Will obtain labs with lytes this morning; NG removed. Will advance to [facility] and obtain PT consult. SW to plan for transportation upon DC which I anticipate will be tomorrow.

## 2022-12-21 NOTE — PROGRESS NOTE ADULT - SUBJECTIVE AND OBJECTIVE BOX
GENERAL SURGERY PROGRESS NOTE    Patient: MADISON CHAVEZ , 67y ([birth year])Male   MRN: [id number]  Location: [place] [address]  Visit: 12-17-22 Inpatient  Date: 12-21-22 @ 08:44    Procedure/Dx/Injuries: SBO 2/2 to left inguinal hernia s/p robotic assisted left inguinal hernia repair (12/19)    Events of past 24 hours: Patient seen and examined at bedside. Patient reports pain is improving. Passing flatus and had a BM last night. Afebrile     PAST MEDICAL & SURGICAL HISTORY:  HTN (hypertension)  Neuropathy  Dyslipidemia  Prostate CA  for 12 years  Emphysema lung  Arthritis    No significant past surgical history    Vitals:   T(F): 99.6 (12-21-22 @ 05:30), Max: 99.6 (12-21-22 @ 05:30)  HR: 82 (12-21-22 @ 05:30)  BP: 143/91 (12-21-22 @ 05:30)  RR: 16 (12-21-22 @ 05:30)  SpO2: 99% (12-21-22 @ 05:30)    Diet, NPO after Midnight:      NPO Start Date: 18-Dec-2022,   NPO Start Time: 23:59  Diet, NPO:   Except Medications    Fluids: dextrose 5% + sodium chloride 0.45% with potassium chloride 20 mEq/L: Solution, 1000 milliLiter(s) infuse at 75 mL/Hr  Provider's Contact #: [phone]    I & O's:    12-20-22 @ 07:01  -  12-21-22 @ 07:00  --------------------------------------------------------  IN:  Total IN: 0 mL  OUT:    Indwelling Catheter - Urethral (mL): 1500 mL  Total OUT: 1500 mL  Total NET: -1500 mL    PHYSICAL EXAM:  General: NAD, AAOx3, calm and cooperative  HEENT: NCAT, EOMI. NGT in place   Cardiac: S1, S2  Respiratory: normal respiratory effort, bilateral breath sounds   Abdomen: Soft, non-distended, non-tender, no rebound, no guarding  Skin: no jaundice    MEDICATIONS  (STANDING):  amLODIPine   Tablet 5 milliGRAM(s) Oral at bedtime  dextrose 5% + sodium chloride 0.45% with potassium chloride 20 mEq/L 1000 milliLiter(s) (75 mL/Hr) IV Continuous <Continuous>  finasteride 5 milliGRAM(s) Oral daily  gabapentin Solution 600 milliGRAM(s) Oral three times a day  heparin   Injectable 5000 Unit(s) SubCutaneous every 8 hours  influenza  Vaccine (HIGH DOSE) 0.7 milliLiter(s) IntraMuscular once  montelukast 10 milliGRAM(s) Oral daily  pantoprazole  Injectable 40 milliGRAM(s) IV Push daily  tamsulosin 0.4 milliGRAM(s) Oral at bedtime    MEDICATIONS  (PRN):  acetaminophen    Suspension .. 650 milliGRAM(s) Oral every 6 hours PRN Moderate Pain (4 - 6)    DVT PROPHYLAXIS: heparin   Injectable 5000 Unit(s) SubCutaneous every 8 hours  GI PROPHYLAXIS: pantoprazole  Injectable 40 milliGRAM(s) IV Push daily    LAB/STUDIES:  Labs:    IMAGING:  < from: CT Abdomen and Pelvis w/ Oral Cont and w/ IV Cont (12.20.22 @ 13:07) >  IMPRESSION:  1.  Since December 16, 2022, postsurgical changes in the abdominal wall   and left inguinal canal from recent left inguinal hernia repair.    2.  Diffuse mildly dilated, fluid-filled colonic and small bowel loops   seen throughout the abdomen with majority of the oral contrast currently   within the proximal small bowel. Findings most likely represent   postoperative ileus. A short-term follow-up CT abdomen pelvis can be   obtained in 6 hours to assess for contrast transit.  --- End of Report ---   GENERAL SURGERY PROGRESS NOTE    Patient: MADISON CHAVEZ , 67y ([birth year])Male   MRN: [id number]  Location: [place] [address]  Visit: 12-17-22 Inpatient  Date: 12-21-22 @ 08:44    Procedure/Dx/Injuries: SBO 2/2 to left inguinal hernia s/p robotic assisted left inguinal hernia repair (12/19)    Events of past 24 hours: Patient seen and examined at bedside. Patient reports pain is improving. Passing flatus and had a BM last night. Afebrile     PAST MEDICAL & SURGICAL HISTORY:  HTN (hypertension)  Neuropathy  Dyslipidemia  Prostate CA  for 12 years  Emphysema lung  Arthritis    No significant past surgical history    Vitals:   T(F): 99.6 (12-21-22 @ 05:30), Max: 99.6 (12-21-22 @ 05:30)  HR: 82 (12-21-22 @ 05:30)  BP: 143/91 (12-21-22 @ 05:30)  RR: 16 (12-21-22 @ 05:30)  SpO2: 99% (12-21-22 @ 05:30)    Diet, NPO after Midnight:      NPO Start Date: 18-Dec-2022,   NPO Start Time: 23:59  Diet, NPO:   Except Medications    Fluids: dextrose 5% + sodium chloride 0.45% with potassium chloride 20 mEq/L: Solution, 1000 milliLiter(s) infuse at 75 mL/Hr  Provider's Contact #: [phone]    I & O's:    12-20-22 @ 07:01  -  12-21-22 @ 07:00  --------------------------------------------------------  IN:  Total IN: 0 mL  OUT:    Indwelling Catheter - Urethral (mL): 1500 mL  Total OUT: 1500 mL  Total NET: -1500 mL    PHYSICAL EXAM:  General: NAD, AAOx3, calm and cooperative  HEENT: NCAT, EOMI. NGT in place   Cardiac: S1, S2  Respiratory: normal respiratory effort, bilateral breath sounds   Abdomen: Soft, non-distended, non-tender, no rebound, no guarding  Skin: no jaundice    MEDICATIONS  (STANDING):  amLODIPine   Tablet 5 milliGRAM(s) Oral at bedtime  dextrose 5% + sodium chloride 0.45% with potassium chloride 20 mEq/L 1000 milliLiter(s) (75 mL/Hr) IV Continuous <Continuous>  finasteride 5 milliGRAM(s) Oral daily  gabapentin Solution 600 milliGRAM(s) Oral three times a day  heparin   Injectable 5000 Unit(s) SubCutaneous every 8 hours  influenza  Vaccine (HIGH DOSE) 0.7 milliLiter(s) IntraMuscular once  montelukast 10 milliGRAM(s) Oral daily  pantoprazole  Injectable 40 milliGRAM(s) IV Push daily  tamsulosin 0.4 milliGRAM(s) Oral at bedtime    MEDICATIONS  (PRN):  acetaminophen    Suspension .. 650 milliGRAM(s) Oral every 6 hours PRN Moderate Pain (4 - 6)    DVT PROPHYLAXIS: heparin   Injectable 5000 Unit(s) SubCutaneous every 8 hours  GI PROPHYLAXIS: pantoprazole  Injectable 40 milliGRAM(s) IV Push daily    LAB/STUDIES:  Labs:    IMAGING:  Xray Kidney Ureter Bladder (12.20.22 @ 19:46) >  IMPRESSION:    There is persistent dilatation predominantly of small bowel noted   throughout the abdomen. There is no definite enteric contrast within   large bowel; localization is somewhat limited likely due to dilution in   liquid bowel contents.    An enteric tube is noted without visualized side port in the   field-of-view and likely at the level of the distal esophagus. Consider   advancing.        < from: CT Abdomen and Pelvis w/ Oral Cont and w/ IV Cont (12.20.22 @ 13:07) >  IMPRESSION:  1.  Since December 16, 2022, postsurgical changes in the abdominal wall   and left inguinal canal from recent left inguinal hernia repair.    2.  Diffuse mildly dilated, fluid-filled colonic and small bowel loops   seen throughout the abdomen with majority of the oral contrast currently   within the proximal small bowel. Findings most likely represent   postoperative ileus. A short-term follow-up CT abdomen pelvis can be   obtained in 6 hours to assess for contrast transit.  --- End of Report ---

## 2022-12-21 NOTE — PHYSICAL THERAPY INITIAL EVALUATION ADULT - GENERAL OBSERVATIONS, REHAB EVAL
10:45 -1:15 Chart reviewed. Order received.  Patient available to be seen for Pt, confirmed with RN. pt encountered  Semi cameron in the bed  denies pain, and agrees to participate in session, + Carisa ,+ FRANSICO Beck

## 2022-12-21 NOTE — PROGRESS NOTE ADULT - ASSESSMENT
ASSESSMENT:  67M w/ PMHx of migraines, HTN, HLD, COPD not on home O2, Prostate Cancer s/p radiation and resection 4 years ago, recent replacement penile implant 4 weeks ago admitted w/ SBO associated w/ L inguinal hernia. s/p robotic assisted left inguinal hernia repair 12/17. Physical exam findings, imaging, and labs as documented above.     PLAN:  #SBO  #Left inguinal hernia  #s/p robotic assisted left inguinal hernia repair   -NPO w/ IVF  -NGT to LCWS  -Possible d/c NGT and advance to clear   -Monitor bowel function   -Encourage IS and ambulation   -Pain control   -DVT ppx     ASSESSMENT:  67M w/ PMHx of migraines, HTN, HLD, COPD not on home O2, Prostate Cancer s/p radiation and resection 4 years ago, recent replacement penile implant 4 weeks ago admitted w/ SBO associated w/ L inguinal hernia. s/p robotic assisted left inguinal hernia repair 12/17. Physical exam findings, imaging, and labs as documented above.     Pt seen with Dr. Milligan:     PLAN:  #SBO  #Left inguinal hernia  #s/p POD #2 robotic assisted left inguinal hernia repair   -NGT removed this way as pt now with FL/BM's  - start clear liquids  - OOb/ambulate; F/U PT consult  - anticipate advancing diet later today/tomorrow and D/C home tomorrow  - Case mgmt consult: neither pt nor wife drives and therefore will need ambulette home tomorrow    #HTn   - cont amlodipine  - resume all home meds upon D/C    #HLD   - cont home meds upon D/C    #COPD   - cont home meds upon D/C (montelukast, symbicort, atrovent)    #Hx prostate ca, s/p RT, penile implant   - cont home meds  upon D/C (oxybutinin, finasteride, flomax))    #DVT prophylaxis   - SQ heparin     ASSESSMENT:  67M w/ PMHx of migraines, HTN, HLD, COPD not on home O2, Prostate Cancer s/p radiation and resection 4 years ago, recent replacement penile implant 4 weeks ago admitted w/ SBO associated w/ L inguinal hernia. s/p robotic assisted left inguinal hernia repair 12/17. Physical exam findings, imaging, and labs as documented above.     Pt seen with Dr. Milligan:     PLAN:  #SBO  #Left inguinal hernia  #s/p POD #2 robotic assisted left inguinal hernia repair   -NGT removed this way as pt now with FL/BM's  - start clear liquids  - OOb/ambulate; F/U PT consult  - anticipate advancing diet later today/tomorrow and D/C home tomorrow  - Case mgmt consult: neither pt nor wife drives and therefore will need ambulette home tomorrow    #HTN   - cont amlodipine  - resume all home meds upon D/C    #HLD   - cont home meds upon D/C    #COPD   - cont home meds upon D/C (montelukast, symbicort, atrovent)    #Hx prostate ca, s/p RT, penile implant   - cont home meds  upon D/C (oxybutinin, finasteride, flomax))    #DVT prophylaxis   - SQ heparin     ASSESSMENT:  67M w/ PMHx of migraines, HTN, HLD, COPD not on home O2, Prostate Cancer s/p radiation and resection 4 years ago, recent replacement penile implant 4 weeks ago admitted w/ SBO associated w/ L inguinal hernia. s/p robotic assisted left inguinal hernia repair 12/17. Physical exam findings, imaging, and labs as documented above.     Pt seen with Dr. Milligan:     PLAN:  #SBO  #Left inguinal hernia  #s/p POD #2 robotic assisted left inguinal hernia repair   -NGT removed this way as pt now with FL/BM's  - start clear liquids  - OOb/ambulate; F/U PT consult  - anticipate advancing diet later today/tomorrow and D/C home tomorrow  - Case mgmt consult: neither pt nor wife drives and therefore will need ambulette home tomorrow    #HTN   - cont amlodipine  - resume all home meds upon D/C    #COPD   - cont home meds upon D/C (montelukast, symbicort, atrovent)    #Hx prostate ca, s/p RT, penile implant   - cont home meds  upon D/C (oxybutinin, finasteride, flomax))    #DVT prophylaxis   - SQ heparin

## 2022-12-21 NOTE — PHYSICAL THERAPY INITIAL EVALUATION ADULT - PERTINENT HX OF CURRENT PROBLEM, REHAB EVAL
67M w/ PMHx of migraines, HTN, HLD, COPD not on home O2, Prostate Cancer s/p radiation and resection 4 years ago, recent replacement penile implant 4 weeks ago (Dr. Juarez, [facility]), H/O EtOH abuse quit in 2013, tobacco use quit in 2013, illicit drug use including cocaine, quit in 2005, presenting with 3 days of steady 5-6/10 abdominal pain associated w/ nausea and lightheadedness x 3 days. Since yesterday, patient stated he has started having episodes of NBNB emesis following even small meals and stopped having BMs. Patient stated he tried milk of magnesia and only had a very small BM. Patient states his last colonoscopy was 5 years ago and was significant for 2 noncancerous polyps which were excised. Patient endorses using cocaine, crack, and drinking alcohol but states he has not consumed these for several years.

## 2022-12-22 ENCOUNTER — TRANSCRIPTION ENCOUNTER (OUTPATIENT)
Age: 67
End: 2022-12-22

## 2022-12-22 VITALS
HEART RATE: 105 BPM | SYSTOLIC BLOOD PRESSURE: 137 MMHG | OXYGEN SATURATION: 99 % | TEMPERATURE: 99 F | RESPIRATION RATE: 16 BRPM | DIASTOLIC BLOOD PRESSURE: 88 MMHG

## 2022-12-22 LAB
ANION GAP SERPL CALC-SCNC: 9 MMOL/L — SIGNIFICANT CHANGE UP (ref 7–14)
BUN SERPL-MCNC: 11 MG/DL — SIGNIFICANT CHANGE UP (ref 10–20)
CALCIUM SERPL-MCNC: 8.3 MG/DL — LOW (ref 8.4–10.5)
CHLORIDE SERPL-SCNC: 99 MMOL/L — SIGNIFICANT CHANGE UP (ref 98–110)
CO2 SERPL-SCNC: 32 MMOL/L — SIGNIFICANT CHANGE UP (ref 17–32)
CREAT SERPL-MCNC: 0.7 MG/DL — SIGNIFICANT CHANGE UP (ref 0.7–1.5)
CULTURE RESULTS: SIGNIFICANT CHANGE UP
CULTURE RESULTS: SIGNIFICANT CHANGE UP
EGFR: 102 ML/MIN/1.73M2 — SIGNIFICANT CHANGE UP
GLUCOSE SERPL-MCNC: 116 MG/DL — HIGH (ref 70–99)
HCT VFR BLD CALC: 37.5 % — LOW (ref 42–52)
HGB BLD-MCNC: 12.4 G/DL — LOW (ref 14–18)
MAGNESIUM SERPL-MCNC: 1.8 MG/DL — SIGNIFICANT CHANGE UP (ref 1.8–2.4)
MCHC RBC-ENTMCNC: 29.6 PG — SIGNIFICANT CHANGE UP (ref 27–31)
MCHC RBC-ENTMCNC: 33.1 G/DL — SIGNIFICANT CHANGE UP (ref 32–37)
MCV RBC AUTO: 89.5 FL — SIGNIFICANT CHANGE UP (ref 80–94)
NRBC # BLD: 0 /100 WBCS — SIGNIFICANT CHANGE UP (ref 0–0)
PHOSPHATE SERPL-MCNC: 3 MG/DL — SIGNIFICANT CHANGE UP (ref 2.1–4.9)
PLATELET # BLD AUTO: 316 K/UL — SIGNIFICANT CHANGE UP (ref 130–400)
POTASSIUM SERPL-MCNC: 3.1 MMOL/L — LOW (ref 3.5–5)
POTASSIUM SERPL-SCNC: 3.1 MMOL/L — LOW (ref 3.5–5)
RBC # BLD: 4.19 M/UL — LOW (ref 4.7–6.1)
RBC # FLD: 12.6 % — SIGNIFICANT CHANGE UP (ref 11.5–14.5)
SODIUM SERPL-SCNC: 140 MMOL/L — SIGNIFICANT CHANGE UP (ref 135–146)
SPECIMEN SOURCE: SIGNIFICANT CHANGE UP
SPECIMEN SOURCE: SIGNIFICANT CHANGE UP
WBC # BLD: 10.93 K/UL — HIGH (ref 4.8–10.8)
WBC # FLD AUTO: 10.93 K/UL — HIGH (ref 4.8–10.8)

## 2022-12-22 RX ORDER — POTASSIUM CHLORIDE 20 MEQ
20 PACKET (EA) ORAL ONCE
Refills: 0 | Status: COMPLETED | OUTPATIENT
Start: 2022-12-22 | End: 2022-12-22

## 2022-12-22 RX ADMIN — GABAPENTIN 600 MILLIGRAM(S): 400 CAPSULE ORAL at 05:29

## 2022-12-22 RX ADMIN — MONTELUKAST 10 MILLIGRAM(S): 4 TABLET, CHEWABLE ORAL at 12:33

## 2022-12-22 RX ADMIN — DEXTROSE MONOHYDRATE, SODIUM CHLORIDE, AND POTASSIUM CHLORIDE 75 MILLILITER(S): 50; .745; 4.5 INJECTION, SOLUTION INTRAVENOUS at 09:30

## 2022-12-22 RX ADMIN — HEPARIN SODIUM 5000 UNIT(S): 5000 INJECTION INTRAVENOUS; SUBCUTANEOUS at 05:30

## 2022-12-22 RX ADMIN — FINASTERIDE 5 MILLIGRAM(S): 5 TABLET, FILM COATED ORAL at 12:33

## 2022-12-22 RX ADMIN — GABAPENTIN 600 MILLIGRAM(S): 400 CAPSULE ORAL at 14:18

## 2022-12-22 RX ADMIN — HEPARIN SODIUM 5000 UNIT(S): 5000 INJECTION INTRAVENOUS; SUBCUTANEOUS at 14:18

## 2022-12-22 RX ADMIN — Medication 20 MILLIEQUIVALENT(S): at 12:33

## 2022-12-22 RX ADMIN — PANTOPRAZOLE SODIUM 40 MILLIGRAM(S): 20 TABLET, DELAYED RELEASE ORAL at 12:33

## 2022-12-22 NOTE — DISCHARGE NOTE PROVIDER - PROVIDER TOKENS
PROVIDER:[TOKEN:[[id number]],FOLLOWUP:[1 week]],PROVIDER:[TOKEN:[[id number]],FOLLOWUP:[2 weeks]]

## 2022-12-22 NOTE — DISCHARGE NOTE PROVIDER - NSDCFUADDINST_GEN_ALL_CORE_FT
May shower no baths  leave dressing in place until falls off or seen by Dr Chel Beck to leg bag during day, BSD bag overnight  Stay well hydrated  no heavy lifting over 10lbs  No driving until cleared by MD  Any increased pain, fever, decreased urine output call MD or go to ER ASAP

## 2022-12-22 NOTE — DISCHARGE NOTE PROVIDER - NSDCCPCAREPLAN_GEN_ALL_CORE_FT
PRINCIPAL DISCHARGE DIAGNOSIS  Diagnosis: SBO (small bowel obstruction)  Assessment and Plan of Treatment: s/p LIH repair  follow up Dr Milligan 2 weeks  otrin/Tylenol for pain PRN      SECONDARY DISCHARGE DIAGNOSES  Diagnosis: Urethral stricture  Assessment and Plan of Treatment: s/p dilation and lancaster placement  cont lancaster to leg bag  stay well hydrated  fup Dr Otero for further management

## 2022-12-22 NOTE — DISCHARGE NOTE NURSING/CASE MANAGEMENT/SOCIAL WORK - PATIENT PORTAL LINK FT
You can access the FollowMyHealth Patient Portal offered by [facility] by registering at the following website: [email or web address]. By joining [employer]’s FollowMyHealth portal, you will also be able to view your health information using other applications (apps) compatible with our system.

## 2022-12-22 NOTE — DISCHARGE NOTE PROVIDER - CARE PROVIDER_API CALL
Axel Otero)  Urology  [address] [place] [id number]  Phone: [phone]  Fax: [phone]  Follow Up Time: 1 week    Raúl Milligan)  Surgery  [address] [place] [address]  Phone: [phone]  Fax: [phone]  Follow Up Time: 2 weeks

## 2022-12-22 NOTE — DISCHARGE NOTE PROVIDER - NSDCMRMEDTOKEN_GEN_ALL_CORE_FT
Ambien: 12.5 milligram(s) orally once a day (at bedtime), As Needed  amLODIPine 5 mg oral tablet: 1 tab(s) orally once a day  famotidine 20 mg oral tablet: 1 tab(s) orally once a day, As Needed  finasteride 5 mg oral tablet: 1 tab(s) orally once a day  gabapentin 600 mg oral tablet: 1 tab(s) orally 3 times a day  montelukast 10 mg oral tablet: 1 tab(s) orally once a day  tamsulosin 0.4 mg oral capsule: 1 cap(s) orally once a day

## 2022-12-22 NOTE — DISCHARGE NOTE PROVIDER - HOSPITAL COURSE
67M w/ PMHx of migraines, HTN, HLD, COPD not on home O2, Prostate Cancer s/p radiation and resection 4 years ago, recent replacement penile implant 4 weeks ago (Dr. Juarez, [facility]), H/O EtOH abuse quit in 2013, tobacco use quit in 2013, illicit drug use including cocaine, quit in 2005, presenting with 3 days of steady 5-6/10 abdominal pain associated w/ nausea and lightheadedness x 3 days. Since yesterday, patient stated he has started having episodes of NBNB emesis following even small meals and stopped having BMs. Patient stated he tried milk of magnesia and only had a very small BM. Patient states his last colonoscopy was 5 years ago and was significant for 2 noncancerous polyps which were excised. Patient endorses using cocaine, crack, and drinking alcohol but states he has not consumed these for several years.     Surgery was consulted for evaluation of possible SBO in setting of L inguinal hernia. CT scan was significant for severely dilated loops of small bowel with air fluid levels and a transition point near the L inguinal hernia. NGT was placed and 400cc fluid obtained upon insertion. Hernia was reduced in ED. Patient denies any other symptoms.     Pt was taken to OR 12/19 and underwent Robot-assisted repair of left inguinal hernia using da Malena Xi. Pt had an uneventful pot op course with NGT removed on day 2. Pt tolerated advanced diet and was stable for DC home with VNS    Pt to be Dc'd with lancaster to leg bag and follow up with urology next week for further management.

## 2022-12-22 NOTE — DISCHARGE NOTE PROVIDER - DID THE PATIENT PRESENT WITH OR WAS TREATED FOR MALNUTRITION DURING THIS ADMISSION
SUBJECTIVE:   Diallo Polanco is a 17 year old male, here for a routine health maintenance visit,   accompanied by his mother and father.    Patient was roomed by: Caitlin Joy MA  Do you have any forms to be completed?  no  Teachers challenge.   Concentration  Stress homework     SOCIAL HISTORY  Family members in house: mother, father and sister  Language(s) spoken at home: English, Haitian  Recent family changes/social stressors: none noted    SAFETY/HEALTH RISKS  TB exposure:           None  Cardiac risk assessment:     Family history (males <55, females <65) of angina (chest pain), heart attack, heart surgery for clogged arteries, or stroke: no    Biological parent(s) with a total cholesterol over 240:  no    DENTAL  Water source:  city water  Does your child have a dental provider: Yes  Has your child seen a dentist in the last 6 months: Yes  Dental health HIGH risk factors: none    Dental visit recommended: Yes      Sports Physical:  SPORTS QUESTIONNAIRE:  ======================   School: [employer]                          Grade: 10th                   Sports: Tennis  1. no - Has a doctor ever denied or restricted your participation in sports for any reason or told you to give up sports?  2. no - Do you have an ongoing medical condition (like diabetes,asthma, anemia, infections)?   3. no - Are you currently taking any prescription or nonprescription (over-the-counter) medicines or pills?    4. no - Do you have allergies to medicines, pollens, foods or stinging insects?    5. no - Have you ever spent the night in a hospital?  6. no - Have you ever had surgery?   7. no - Have you ever passed out or nearly passed out DURING exercise?  8. no - Have you ever passed out or nearly passed out AFTER exercise?  9. no -Have you ever had discomfort, pain, tightness, or pressure in your chest during exercise?  10. no -Does your heart race or skip beats (irregular beats) during  exercise?   11. no -Has a doctor ever told you that you have ;high blood pressure, a heart murmur, high cholesterol,a heart infection, Rheumatic fever, Kawasaki's Disease?  12. no - Has a doctor ever ordered a test for your heart? (example, ECG/EKG, Echocardiogram, stress test)  13. no -Do you ever get lightheaded or feel more short of breath than expected during exercise?   14. no-Have you ever had an unexplained seizure?   15. no - Do you get more tired or short of breath more quickly than your friends during exercise?   16. no - Has any family member or relative  of heart problems or had an unexpected or unexplained sudden death before age 50 (including unexplained drowning, unexplained car accident or sudden infant death syndrome)?  17. no - Does anyone in your family have hypertrophic cardiomyopathy, Marfan Syndrome, arrhythmogenic right ventricular cardiomyopathy, long QT syndrome, short QT syndrome, Brugada syndrome, or catecholaminergic polymorphic ventricular tachycardia?    18. no - Does anyone in your family have a heart problem, pacemaker, or implanted defibrillator?   19. no -Has anyone in your family had unexplained fainting, unexplained seizures, or near drowning?   20. no - Have you ever had an injury, like a sprain, muscle or ligament tear or tendonitis, that caused you to miss a practice or game?   21. no - Have you had any broken or fractured bones, or dislocated joints?   22 no - Have you had an injury that required x-rays, MRI, CT, surgery, injections, therapy, a brace, a cast, or crutches?    23. no - Have you ever had a stress fracture?   24. no - Have you ever been told that you have or have you had an x-ray for neck instability or atlantoaxial instability? (Down syndrome or dwarfism)  25. no - Do you regularly use a brace, orthotics or assistive device?    26. no -Do you have a bone,muscle, or joint injury that bothers you?   27. no- Do any of your joints become painful, swollen, feel  warm or look red?   28. no -Do you have any history of juvenile arthritis or connective tissue disease?   29. no - Has a doctor ever told you that you have asthma or allergies?   30. no - Do you cough, wheeze, have chest tightness, or have difficulty breathing during or after exercise?    31. no - Is there anyone in your family who has asthma?    32. no - Have you ever used an inhaler or taken asthma medicine?   33. no - Do you develop a rash or hives when you exercise?   34. no - Were you born without or are you missing a kidney, an eye, a testicle (males), or any other organ?  35. no- Do you have groin pain or a painful bulge or hernia in the groin area?   36. no - Have you had infectious mononucleosis (mono) within the last month?   37. no - Do you have any rashes, pressure sores, or other skin problems?   38. no - Have you had a herpes or MRSA skin infection?    39. no - Have you ever had a head injury or concussion?   40. no - Have you ever had a hit or blow in the head that caused confusion, prolonged headaches, or memory problems?    41. no - Do you have a history of seizure disorder?    42. no - Do you have headaches with exercise?   43. no - Have you ever had numbness, tingling or weakness in your arms or legs after being hit or falling?   44. no - Have you ever been unable to move your arms or legs after being hit or falling?   45. no -Have you ever become ill while exercising in the heat?  46. no -Do you get frequent muscle cramps when exercising?  47. no - Do you or someone in your family have sickle cell trait or disease?    48. no - Have you had any problems with your eyes or vision?   49. no - Have you had any eye injuries?   50. no - Do you wear glasses or contact lenses?    51. no - Do you wear protective eyewear, such as goggles or a face shield?  52. no- Do you worry about your weight?    53. no - Are you trying to or has anyone recommended that you gain or lose weight?    54. no- Are you on a  special diet or do you avoid certain types of foods?  55. no- Have you ever had an eating disorder?   56. no - Do you have any concerns that you would like to discuss with a doctor?      VISION :  Testing not done; patient has seen eye doctor in the past 12 months.    HEARING :  Testing not done:  12 months    HOME  No concerns    EDUCATION  School:  [employer]  Grade: 10th  Days of school missed: 5 or fewer  School performance / Academic skills:     SAFETY  Driving:  Seat belt always worn:  Yes  Helmet worn for bicycle/roller blades/skateboard:  NO  Guns/firearms in the home: No  No safety concerns    ACTIVITIES  Do you get at least 60 minutes per day of physical activity, including time in and out of school: NO  Extracurricular activities: Band  Organized team sports: tennis  None    ELECTRONIC MEDIA  Media use: >2 hours/ day    DIET  Do you get at least 4 helpings of a fruit or vegetable every day: NO  How many servings of juice, non-diet soda, punch or sports drinks per day: 2 per week      PSYCHO-SOCIAL/DEPRESSION  General screening:  Pediatric Symptom Checklist-Youth PASS (<30 pass), no followup necessary  No concerns    SLEEP  Sleep concerns: No concerns, sleeps well through night  Bedtime on a school night: 10  Wake up time for school: 7:30 am  Sleep duration on a school night (hours/night):   Difficulty shutting off thoughts at night: No  Daytime naps: No    QUESTIONS/CONCERNS: None    DRUGS  Smoking:  no  Passive smoke exposure:  no  Alcohol:  no  Drugs:  no    SEXUALITY  Sexual activity: No           PROBLEM LIST  Patient Active Problem List   Diagnosis     Childhood obesity     School Concerns leading to possible depression     Eczema     Morbid obesity (H)     MEDICATIONS  Current Outpatient Medications   Medication Sig Dispense Refill     hydrocortisone 2.5 % cream Apply to area BID (Patient not taking: Reported on 1/17/2018) 60 g 3     ibuprofen (ADVIL,MOTRIN) 200 MG tablet Take 200  "mg by mouth every 4 hours as needed for mild pain       mineral oil-hydrophilic petrolatum (AQUAPHOR) ointment Apply to dry area BID (Patient not taking: Reported on 1/17/2018) 420 g 0     mometasone (ELOCON) 0.1 % cream Apply  topically daily. Apply sparingly to affected area.  Do not apply to face. (Patient not taking: Reported on 1/17/2018) 45 g 2      ALLERGY  No Known Allergies    IMMUNIZATIONS  Immunization History   Administered Date(s) Administered     DTAP (<7y) 01/07/2003, 03/05/2003, 05/06/2003, 02/04/2004, 10/06/2008     HEPA 10/25/2011, 12/17/2014     HPV9 02/06/2019     HepB 01/07/2003, 03/05/2003, 11/06/2003     Hib (PRP-T) 01/07/2003, 03/05/2003, 11/06/2003     Influenza (H1N1) 11/17/2009     Influenza (IIV3) PF 11/06/2006, 11/13/2007, 11/17/2008, 09/28/2010, 10/25/2011, 10/24/2012     Influenza Intranasal Vaccine 4 valent 01/11/2016     Influenza Vaccine IM 3yrs+ 4 Valent IIV4 12/04/2013, 12/17/2014, 01/17/2018     MMR 02/04/2004, 10/06/2008     Meningococcal (Menactra ) 12/17/2014, 02/06/2019     Pneumococcal (PCV 7) 01/07/2003, 03/05/2003, 05/06/2003, 11/06/2003     Poliovirus, inactivated (IPV) 01/07/2003, 03/05/2003, 02/04/2004, 10/06/2008     TDAP Vaccine (Boostrix) 12/17/2014     Varicella 11/06/2003, 10/06/2008       HEALTH HISTORY SINCE LAST VISIT  No surgery, major illness or injury since last physical exam    ROS  Constitutional, eye, ENT, skin, respiratory, cardiac, and GI are normal except as otherwise noted.    OBJECTIVE:   EXAM  /81 (BP Location: Right arm, Patient Position: Chair, Cuff Size: Adult Regular)   Pulse 99   Temp 98.6  F (37  C) (Oral)   Ht 1.765 m (5' 9.5\")   Wt 97.5 kg (215 lb)   SpO2 97%   BMI 31.29 kg/m    63 %ile based on CDC (Boys, 2-20 Years) Stature-for-age data based on Stature recorded on 2/6/2019.  99 %ile based on CDC (Boys, 2-20 Years) weight-for-age data based on Weight recorded on 2/6/2019.  98 %ile based on CDC (Boys, 2-20 Years) BMI-for-age " based on body measurements available as of 2/6/2019.  Blood pressure percentiles are >99 % systolic and 90 % diastolic based on the August 2017 AAP Clinical Practice Guideline. This reading is in the Stage 2 hypertension range (BP >= 140/90).  GENERAL: Active, alert, in no acute distress.  SKIN: Clear. No significant rash, abnormal pigmentation or lesions  HEAD: Normocephalic  EYES: Pupils equal, round, reactive, Extraocular muscles intact. Normal conjunctivae.  EARS: Normal canals. Tympanic membranes are normal; gray and translucent.  NOSE: Normal without discharge.  MOUTH/THROAT: Clear. No oral lesions. Teeth without obvious abnormalities.  NECK: Supple, no masses.  No thyromegaly.  LYMPH NODES: No adenopathy  LUNGS: Clear. No rales, rhonchi, wheezing or retractions  HEART: Regular rhythm. Normal S1/S2. No murmurs. Normal pulses.  ABDOMEN: Soft, non-tender, not distended, no masses or hepatosplenomegaly. Bowel sounds normal.   NEUROLOGIC: No focal findings. Cranial nerves grossly intact: DTR's normal. Normal gait, strength and tone  BACK: Spine is straight, no scoliosis.  EXTREMITIES: Full range of motion, no deformities  -M: Normal male external genitalia. Lv stage 5,  both testes descended, no hernia.      ASSESSMENT/PLAN:       ICD-10-CM    1. Encounter for routine child health examination w/o abnormal findings Z00.129 PURE TONE HEARING TEST, AIR     SCREENING, VISUAL ACUITY, QUANTITATIVE, BILAT     BEHAVIORAL / EMOTIONAL ASSESSMENT [[id number]]     VACCINE ADMINISTRATION, INITIAL     VACCINE ADMINISTRATION, EACH ADDITIONAL       Anticipatory Guidance  The following topics were discussed:  SOCIAL/ FAMILY:    Peer pressure    Bullying    Increased responsibility    Parent/ teen communication    Limits/ consequences    Social media    TV/ media    School/ homework  NUTRITION:    Healthy food choices    Family meals    Calcium     Vitamins/ supplements  HEALTH / SAFETY:    Adequate sleep/ exercise    Sleep  issues    Dental care    Drugs, ETOH, smoking    Sunscreen/ insect repellent    Contact sports    Bike/ sport helmets    Lawn mowers  SEXUALITY:    Preventive Care Plan  Immunizations    See orders in EpicCare.  I reviewed the signs and symptoms of adverse effects and when to seek medical care if they should arise.  Referrals/Ongoing Specialty care: No   See other orders in EpicCare.  Cleared for sports:  Yes  BMI at 98 %ile based on CDC (Boys, 2-20 Years) BMI-for-age based on body measurements available as of 2/6/2019.  No weight concerns.  Dyslipidemia risk:    None    FOLLOW-UP:    in 1 year for a Preventive Care visit    ICD-10-CM    1. Encounter for routine child health examination w/o abnormal findings Z00.129 PURE TONE HEARING TEST, AIR     SCREENING, VISUAL ACUITY, QUANTITATIVE, BILAT     BEHAVIORAL / EMOTIONAL ASSESSMENT [[id number]]     VACCINE ADMINISTRATION, INITIAL     VACCINE ADMINISTRATION, EACH ADDITIONAL       Resources  HPV and Cancer Prevention:  What Parents Should Know  What Kids Should Know About HPV and Cancer  Goal Tracker: Be More Active  Goal Tracker: Less Screen Time  Goal Tracker: Drink More Water  Goal Tracker: Eat More Fruits and Veggies  [place] Child and Teen Checkups (C&TC) Schedule of Age-Related Screening Standards    Gama Cardenas MD  [facility]   No

## 2022-12-22 NOTE — PROGRESS NOTE ADULT - SUBJECTIVE AND OBJECTIVE BOX
STATUS POST:  robotic LIH repair, urethral dilation and lancaster placement    POST OPERATIVE DAY #: 3      Subjective: 66 yo male s/p above doing well today. Pt denies much pain, has no n/v, f/c. Admits to flatus and BM. Tolerating clears. OOB to commode.             Vital Signs Last 24 Hrs  T(C): 37.1 (22 Dec 2022 04:56), Max: 37.6 (21 Dec 2022 20:22)  T(F): 98.8 (22 Dec 2022 04:56), Max: 99.7 (21 Dec 2022 20:22)  HR: 105 (22 Dec 2022 04:56) (70 - 105)  BP: 137/88 (22 Dec 2022 04:56) (133/78 - 160/92)  BP(mean): --  RR: 16 (22 Dec 2022 04:56) (16 - 16)  SpO2: 99% (22 Dec 2022 04:56) (99% - 99%)    Parameters below as of 21 Dec 2022 20:22  Patient On (Oxygen Delivery Method): room air        General Appearance: Appears well, NAD  Neck: Supple  Chest: Equal expansion bilaterally, equal breath sounds  CV: Pulse regular presently  Abdomen: Soft, nontense, appropriate incisional tenderness, dressings clean and dry and intact  Lancaster in place dng clr yelllow urine  Extremities: Grossly symmetric, no calf tend b/l    Lancaster: yellow urine      I&O's Summary    21 Dec 2022 07:01  -  22 Dec 2022 07:00  --------------------------------------------------------  IN: 0 mL / OUT: 950 mL / NET: -950 mL      I&O's Detail    21 Dec 2022 07:01  -  22 Dec 2022 07:00  --------------------------------------------------------  IN:  Total IN: 0 mL    OUT:    Indwelling Catheter - Urethral (mL): 950 mL  Total OUT: 950 mL    Total NET: -950 mL          MEDICATIONS  (STANDING):  amLODIPine   Tablet 5 milliGRAM(s) Oral at bedtime  dextrose 5% + sodium chloride 0.45% with potassium chloride 20 mEq/L 1000 milliLiter(s) (75 mL/Hr) IV Continuous <Continuous>  finasteride 5 milliGRAM(s) Oral daily  gabapentin Solution 600 milliGRAM(s) Oral three times a day  heparin   Injectable 5000 Unit(s) SubCutaneous every 8 hours  influenza  Vaccine (HIGH DOSE) 0.7 milliLiter(s) IntraMuscular once  montelukast 10 milliGRAM(s) Oral daily  pantoprazole  Injectable 40 milliGRAM(s) IV Push daily  potassium chloride    Tablet ER 20 milliEquivalent(s) Oral once  tamsulosin 0.4 milliGRAM(s) Oral at bedtime    MEDICATIONS  (PRN):  acetaminophen    Suspension .. 650 milliGRAM(s) Oral every 6 hours PRN Moderate Pain (4 - 6)  zolpidem 5 milliGRAM(s) Oral at bedtime PRN Insomnia      LABS:                        12.4   10.93 )-----------( 316      ( 22 Dec 2022 05:22 )             37.5     12-22    140  |  99  |  11  ----------------------------<  116<H>  3.1<L>   |  32  |  0.7    Ca    8.3<L>      22 Dec 2022 05:22  Phos  3.0     12-22  Mg     1.8     12-22            RADIOLOGY & ADDITIONAL STUDIES: none new

## 2022-12-22 NOTE — DISCHARGE NOTE NURSING/CASE MANAGEMENT/SOCIAL WORK - NSDCPEFALRISK_GEN_ALL_CORE
For information on Fall & Injury Prevention, visit: [email or web address] OR  [email or web address] OR  https://www.cdc.gov/steadi/patient.html

## 2022-12-22 NOTE — PROGRESS NOTE ADULT - NS ATTEND AMEND GEN_ALL_CORE FT
As above. Pt with bowel function and tolerating CLD. Abdomen soft, nontender as is L groin. Will adv diet and d/c to home with leg bag in place and followup with myself and urology.
Afebrile. Stable. Pt without bowel fxn; NG with modest output, bilious. THere is abdominal distention though minimal tenderness which is appropriate. There is some induration and swelling in the L groin, though without obvious recurrence. Will keep NG in for suspected POI, encourage ambulation, optomize and replete lytes; obtain CT with PO contrast to investigate and rule out early recurrence of hernia.

## 2022-12-28 DIAGNOSIS — K40.30 UNILATERAL INGUINAL HERNIA, WITH OBSTRUCTION, WITHOUT GANGRENE, NOT SPECIFIED AS RECURRENT: ICD-10-CM

## 2022-12-28 DIAGNOSIS — Z87.891 PERSONAL HISTORY OF NICOTINE DEPENDENCE: ICD-10-CM

## 2022-12-28 DIAGNOSIS — K91.89 OTHER POSTPROCEDURAL COMPLICATIONS AND DISORDERS OF DIGESTIVE SYSTEM: ICD-10-CM

## 2022-12-28 DIAGNOSIS — K56.7 ILEUS, UNSPECIFIED: ICD-10-CM

## 2022-12-28 DIAGNOSIS — Z92.3 PERSONAL HISTORY OF IRRADIATION: ICD-10-CM

## 2022-12-28 DIAGNOSIS — E78.5 HYPERLIPIDEMIA, UNSPECIFIED: ICD-10-CM

## 2022-12-28 DIAGNOSIS — J43.9 EMPHYSEMA, UNSPECIFIED: ICD-10-CM

## 2022-12-28 DIAGNOSIS — G62.9 POLYNEUROPATHY, UNSPECIFIED: ICD-10-CM

## 2022-12-28 DIAGNOSIS — Z85.46 PERSONAL HISTORY OF MALIGNANT NEOPLASM OF PROSTATE: ICD-10-CM

## 2022-12-28 DIAGNOSIS — N35.919 UNSPECIFIED URETHRAL STRICTURE, MALE, UNSPECIFIED SITE: ICD-10-CM

## 2022-12-28 DIAGNOSIS — N17.9 ACUTE KIDNEY FAILURE, UNSPECIFIED: ICD-10-CM

## 2022-12-28 DIAGNOSIS — I10 ESSENTIAL (PRIMARY) HYPERTENSION: ICD-10-CM

## 2022-12-28 DIAGNOSIS — F17.200 NICOTINE DEPENDENCE, UNSPECIFIED, UNCOMPLICATED: ICD-10-CM

## 2022-12-29 ENCOUNTER — EMERGENCY (EMERGENCY)
Facility: HOSPITAL | Age: 67
LOS: 0 days | Discharge: HOME | End: 2022-12-29
Attending: EMERGENCY MEDICINE | Admitting: EMERGENCY MEDICINE
Payer: MEDICARE

## 2022-12-29 VITALS
TEMPERATURE: 97 F | DIASTOLIC BLOOD PRESSURE: 73 MMHG | OXYGEN SATURATION: 99 % | HEIGHT: 68 IN | SYSTOLIC BLOOD PRESSURE: 129 MMHG | RESPIRATION RATE: 18 BRPM

## 2022-12-29 DIAGNOSIS — Q54.9 HYPOSPADIAS, UNSPECIFIED: ICD-10-CM

## 2022-12-29 DIAGNOSIS — J43.9 EMPHYSEMA, UNSPECIFIED: ICD-10-CM
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
## 2022-12-29 DIAGNOSIS — K59.00 CONSTIPATION, UNSPECIFIED: ICD-10-CM

## 2022-12-29 DIAGNOSIS — Z92.3 PERSONAL HISTORY OF IRRADIATION: ICD-10-CM

## 2022-12-29 DIAGNOSIS — Z85.46 PERSONAL HISTORY OF MALIGNANT NEOPLASM OF PROSTATE: ICD-10-CM

## 2022-12-29 DIAGNOSIS — Z91.09 OTHER ALLERGY STATUS, OTHER THAN TO DRUGS AND BIOLOGICAL SUBSTANCES: ICD-10-CM
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
## 2022-12-29 DIAGNOSIS — M19.90 UNSPECIFIED OSTEOARTHRITIS, UNSPECIFIED SITE: ICD-10-CM

## 2022-12-29 DIAGNOSIS — E78.5 HYPERLIPIDEMIA, UNSPECIFIED: ICD-10-CM

## 2022-12-29 DIAGNOSIS — Z99.81 DEPENDENCE ON SUPPLEMENTAL OXYGEN: ICD-10-CM
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
## 2022-12-29 DIAGNOSIS — T83.098A OTHER MECHANICAL COMPLICATION OF OTHER URINARY CATHETER, INITIAL ENCOUNTER: ICD-10-CM

## 2022-12-29 DIAGNOSIS — I10 ESSENTIAL (PRIMARY) HYPERTENSION: ICD-10-CM

## 2022-12-29 LAB
ANION GAP SERPL CALC-SCNC: 10 MMOL/L — SIGNIFICANT CHANGE UP (ref 7–14)
APPEARANCE UR: CLEAR — SIGNIFICANT CHANGE UP
BASOPHILS # BLD AUTO: 0.05 K/UL — SIGNIFICANT CHANGE UP (ref 0–0.2)
BASOPHILS NFR BLD AUTO: 0.6 % — SIGNIFICANT CHANGE UP (ref 0–1)
BILIRUB UR-MCNC: NEGATIVE — SIGNIFICANT CHANGE UP
BUN SERPL-MCNC: 11 MG/DL — SIGNIFICANT CHANGE UP (ref 10–20)
CALCIUM SERPL-MCNC: 8.4 MG/DL — SIGNIFICANT CHANGE UP (ref 8.4–10.5)
CHLORIDE SERPL-SCNC: 102 MMOL/L — SIGNIFICANT CHANGE UP (ref 98–110)
CO2 SERPL-SCNC: 27 MMOL/L — SIGNIFICANT CHANGE UP (ref 17–32)
COLOR SPEC: COLORLESS — SIGNIFICANT CHANGE UP
CREAT SERPL-MCNC: 0.8 MG/DL — SIGNIFICANT CHANGE UP (ref 0.7–1.5)
DIFF PNL FLD: NEGATIVE — SIGNIFICANT CHANGE UP
EGFR: 98 ML/MIN/1.73M2 — SIGNIFICANT CHANGE UP
EOSINOPHIL # BLD AUTO: 0.2 K/UL — SIGNIFICANT CHANGE UP (ref 0–0.7)
EOSINOPHIL NFR BLD AUTO: 2.5 % — SIGNIFICANT CHANGE UP (ref 0–8)
GLUCOSE SERPL-MCNC: 119 MG/DL — HIGH (ref 70–99)
GLUCOSE UR QL: NEGATIVE — SIGNIFICANT CHANGE UP
HCT VFR BLD CALC: 30.7 % — LOW (ref 42–52)
HGB BLD-MCNC: 10.3 G/DL — LOW (ref 14–18)
IMM GRANULOCYTES NFR BLD AUTO: 0.9 % — HIGH (ref 0.1–0.3)
KETONES UR-MCNC: NEGATIVE — SIGNIFICANT CHANGE UP
LEUKOCYTE ESTERASE UR-ACNC: NEGATIVE — SIGNIFICANT CHANGE UP
LYMPHOCYTES # BLD AUTO: 1.07 K/UL — LOW (ref 1.2–3.4)
LYMPHOCYTES # BLD AUTO: 13.1 % — LOW (ref 20.5–51.1)
MCHC RBC-ENTMCNC: 30.2 PG — SIGNIFICANT CHANGE UP (ref 27–31)
MCHC RBC-ENTMCNC: 33.6 G/DL — SIGNIFICANT CHANGE UP (ref 32–37)
MCV RBC AUTO: 90 FL — SIGNIFICANT CHANGE UP (ref 80–94)
MONOCYTES # BLD AUTO: 0.56 K/UL — SIGNIFICANT CHANGE UP (ref 0.1–0.6)
MONOCYTES NFR BLD AUTO: 6.9 % — SIGNIFICANT CHANGE UP (ref 1.7–9.3)
NEUTROPHILS # BLD AUTO: 6.21 K/UL — SIGNIFICANT CHANGE UP (ref 1.4–6.5)
NEUTROPHILS NFR BLD AUTO: 76 % — HIGH (ref 42.2–75.2)
NITRITE UR-MCNC: NEGATIVE — SIGNIFICANT CHANGE UP
NRBC # BLD: 0 /100 WBCS — SIGNIFICANT CHANGE UP (ref 0–0)
PH UR: 6.5 — SIGNIFICANT CHANGE UP (ref 5–8)
PLATELET # BLD AUTO: 460 K/UL — HIGH (ref 130–400)
POTASSIUM SERPL-MCNC: 3.7 MMOL/L — SIGNIFICANT CHANGE UP (ref 3.5–5)
POTASSIUM SERPL-SCNC: 3.7 MMOL/L — SIGNIFICANT CHANGE UP (ref 3.5–5)
PROT UR-MCNC: NEGATIVE — SIGNIFICANT CHANGE UP
RBC # BLD: 3.41 M/UL — LOW (ref 4.7–6.1)
RBC # FLD: 13.5 % — SIGNIFICANT CHANGE UP (ref 11.5–14.5)
SODIUM SERPL-SCNC: 139 MMOL/L — SIGNIFICANT CHANGE UP (ref 135–146)
SP GR SPEC: 1 — LOW (ref 1.01–1.03)
UROBILINOGEN FLD QL: SIGNIFICANT CHANGE UP
WBC # BLD: 8.16 K/UL — SIGNIFICANT CHANGE UP (ref 4.8–10.8)
WBC # FLD AUTO: 8.16 K/UL — SIGNIFICANT CHANGE UP (ref 4.8–10.8)

## 2022-12-29 PROCEDURE — 99284 EMERGENCY DEPT VISIT MOD MDM: CPT

## 2022-12-29 RX ORDER — CEPHALEXIN 500 MG
1 CAPSULE ORAL
Qty: 20 | Refills: 0
Start: 2022-12-29 | End: 2023-01-02

## 2022-12-29 NOTE — ED PROVIDER NOTE - CLINICAL SUMMARY MEDICAL DECISION MAKING FREE TEXT BOX
Patient presented with cath induced hypospadias as well as discharge from the meatus.  Patient was seen by urology no signs of urinary tract infection catheter was removed and the culture was sent from the urethral meatus.  Patient is to be discharged antibiotics and follow-up with urology next week.  I also let surgery know that the patient was in the ER.

## 2022-12-29 NOTE — CONSULT NOTE ADULT - SUBJECTIVE AND OBJECTIVE BOX
Urology Consult:     68 yo M with HTN, HLD, COPD on Home O2, Prostate Cancer s/p radiation and resection (4 years ago), s/p removal and replacement of penile implant with Dr. Juarez, [facility]), hx of ETOH abuse, tobacco use quite in , cocaine quit , s/p Robot- assisted repair of Left inguinal hernia and urethral dilation of bladder neck stricture on  presents to ED with constipation and catheter complication-  c/s for purulence around catheter. Patient seen and examined at bedside with wife. Patient reports he had his catheter since his procedure and has a follow-up appointment with Dr. Otero on 22. Patient reports he started having pain around the catheter and noticed that the catheter was "cutting" into his penis. He reports noting some purulence around the surrounding area with associated erythema. Patient admits to penile pain. Denies any urinary symptoms. Denies any fever, chills, SOB/difficulty breathing, abdominal pain, flank pain.       PAST MEDICAL & SURGICAL HISTORY:  HTN (hypertension)      Neuropathy      Dyslipidemia      Prostate CA  for 12 years      Emphysema lung      Arthritis      No significant past surgical history          MEDICATIONS  (STANDING):    MEDICATIONS  (PRN):      Allergies    dust (Unknown)  No Known Drug Allergies    Intolerances        SOCIAL HISTORY: No illicit drug use    FAMILY HISTORY:      REVIEW OF SYSTEMS:  [ ] A ten-point review of systems was otherwise negative except as noted.     Vital Signs Last 24 Hrs  T(C): 36.2 (29 Dec 2022 11:01), Max: 36.2 (29 Dec 2022 11:01)  T(F): 97.1 (29 Dec 2022 11:01), Max: 97.1 (29 Dec 2022 11:01)  BP: 129/73 (29 Dec 2022 11:01) (129/73 - 129/73)  RR: 18 (29 Dec 2022 11:01) (18 - 18)  SpO2: 99% (29 Dec 2022 11:01) (99% - 99%)    Parameters below as of 29 Dec 2022 11:01  Patient On (Oxygen Delivery Method): room air        PHYSICAL EXAM:    GEN: NAD, well-developed, awake and alert.  SKIN: Good color, non diaphoretic.  HEENT: NC/AT.  RESP: No dyspnea, non-labored breathing. No use of accessory muscles.  CARDIO: +S1/S2  ABDO: Soft, NT/ND, no palpable bladder, no suprapubic tenderness  BACK: No CVAT B/L  : +Catheter- induced hypospadia, extending to corona  +  Non circumcised male. B/L descended testicles x 2. No lesions or palpable masses noted B/L. No meatal discharge.       I&O's Summary      LABS:                        10.3   8.16  )-----------( 460      ( 29 Dec 2022 12:01 )             30.7         139  |  102  |  x   ----------------------------<  x   3.7   |  x   |  x           Urinalysis Basic - ( 29 Dec 2022 12:01 )    Color: Colorless / Appearance: Clear / S.003 / pH: x  Gluc: x / Ketone: Negative  / Bili: Negative / Urobili: <2 mg/dL   Blood: x / Protein: Negative / Nitrite: Negative   Leuk Esterase: Negative / RBC: x / WBC x   Sq Epi: x / Non Sq Epi: x / Bacteria: x            RADIOLOGY & ADDITIONAL STUDIES:

## 2022-12-29 NOTE — ED ADULT TRIAGE NOTE - CHIEF COMPLAINT QUOTE
Patient c/o constipation x 2 days, patient also having redness around urinary catheter site +urine in bag

## 2022-12-29 NOTE — ED PROVIDER NOTE - PATIENT PORTAL LINK FT
You can access the FollowMyHealth Patient Portal offered by [facility] by registering at the following website: [email or web address]. By joining [employer]’s FollowMyHealth portal, you will also be able to view your health information using other applications (apps) compatible with our system.

## 2022-12-29 NOTE — ED PROVIDER NOTE - NSFOLLOWUPINSTRUCTIONS_ED_ALL_ED_FT
Follow-up with your urologist on January 4 with your regular schedule appointment.  Return to the ER for unable to urinate in the next 8 hours. Take the antibiotics as prescribed and apply bacitracin to the penis tip. Follow-up with your urologist Dr. Castellanos on January 4 with your regular schedule appointment.  Return to the ER for unable to urinate in the next 8 hours. Take the antibiotics as prescribed and apply bacitracin to the penis tip.

## 2022-12-29 NOTE — ED PROVIDER NOTE - NS ED ROS FT
Review of Systems    Constitutional: (-) fever  Cardiovascular: (-) chest pain  Respiratory: (-) cough, (-) shortness of breath  Gastrointestinal: + Constipation  Musculoskeletal:  (-) back pain  : see hpi  Integumentary: (-) rash, (-) edema

## 2022-12-29 NOTE — CHART NOTE - NSCHARTNOTEFT_GEN_A_CORE
Surgery was called to evaluate patient due to recent robotic assisted repair of left inguinal hernia repair by Dr. Milligan on 12/19 who presented to ED today with purulent discharge from location of lancaster insertion. Patient was discharged from ED prior to surgery evaluation after patient was seen by urology. Unable to provide any recommendations due to inability to assess patient.

## 2022-12-29 NOTE — ED PROVIDER NOTE - PHYSICAL EXAMINATION
CON: ao x 3, HENMT: neck supple,  cta b/l, GI:  soft, nontender, no rebound, no guarding,  catheter induced hypospadias with grreenish/white discharge from urethral meatus SKIN: no rash to inguinal region MSK: no deformities, NEURO: no gross motor or sensory deficit Psychiatric: appropriate mood, appropriate affect

## 2022-12-29 NOTE — ED PROVIDER NOTE - CARE PROVIDER_API CALL
Axel Otero)  Urology  [address] [place] [address]  Phone: [phone]  Fax: [phone]  Follow Up Time:

## 2022-12-29 NOTE — CONSULT NOTE ADULT - ASSESSMENT
66 yo M with HTN, HLD, COPD on Home O2, Prostate Cancer s/p radiation and resection (4 years ago), s/p removal and replacement of penile implant with Dr. Juarez, [facility]), hx of ETOH abuse, tobacco use quite in 2013, cocaine quit 2005, s/p Robot- assisted repair of Left inguinal hernia and urethral dilation of bladder neck stricture on 12/19 presents to ED with constipation and catheter complication-  c/s for purulence around catheter. UA negative, WBC wnl     A) Catheter induced-hypospadias     Plan:   - D/c catheter for TOV, discussed with patient to return to ED if patient experiences abdominal pain, unable to void >8 hrs   - Consider Abx   - Apply bacitracin to affected area   - Urethral Cx - sent   - Encourage PO hydration   - F/u OP with Dr. Otero on 1/4/22 as scheduled for further management  - D/w ED team

## 2022-12-30 LAB
CULTURE RESULTS: NO GROWTH — SIGNIFICANT CHANGE UP
SPECIMEN SOURCE: SIGNIFICANT CHANGE UP

## 2022-12-31 LAB
-  AMPICILLIN/SULBACTAM: SIGNIFICANT CHANGE UP
-  CEFAZOLIN: SIGNIFICANT CHANGE UP
-  CLINDAMYCIN: SIGNIFICANT CHANGE UP
-  ERYTHROMYCIN: SIGNIFICANT CHANGE UP
-  GENTAMICIN: SIGNIFICANT CHANGE UP
-  OXACILLIN: SIGNIFICANT CHANGE UP
-  PENICILLIN: SIGNIFICANT CHANGE UP
-  RIFAMPIN: SIGNIFICANT CHANGE UP
-  TETRACYCLINE: SIGNIFICANT CHANGE UP
-  TRIMETHOPRIM/SULFAMETHOXAZOLE: SIGNIFICANT CHANGE UP
-  VANCOMYCIN: SIGNIFICANT CHANGE UP
METHOD TYPE: SIGNIFICANT CHANGE UP

## 2023-01-01 LAB
-  AMIKACIN: SIGNIFICANT CHANGE UP
-  AMPICILLIN/SULBACTAM: SIGNIFICANT CHANGE UP
-  CEFEPIME: SIGNIFICANT CHANGE UP
-  CEFTAZIDIME: SIGNIFICANT CHANGE UP
-  CIPROFLOXACIN: SIGNIFICANT CHANGE UP
-  GENTAMICIN: SIGNIFICANT CHANGE UP
-  IMIPENEM: SIGNIFICANT CHANGE UP
-  LEVOFLOXACIN: SIGNIFICANT CHANGE UP
-  MEROPENEM: SIGNIFICANT CHANGE UP
-  TOBRAMYCIN: SIGNIFICANT CHANGE UP
-  TRIMETHOPRIM/SULFAMETHOXAZOLE: SIGNIFICANT CHANGE UP
METHOD TYPE: SIGNIFICANT CHANGE UP

## 2023-01-02 LAB
-  PIPERACILLIN/TAZOBACTAM: SIGNIFICANT CHANGE UP
CULTURE RESULTS: SIGNIFICANT CHANGE UP
METHOD TYPE: SIGNIFICANT CHANGE UP
ORGANISM # SPEC MICROSCOPIC CNT: SIGNIFICANT CHANGE UP
SPECIMEN SOURCE: SIGNIFICANT CHANGE UP

## 2023-01-04 ENCOUNTER — APPOINTMENT (OUTPATIENT)
Dept: UROLOGY | Facility: CLINIC | Age: 68
End: 2023-01-04

## 2023-01-06 ENCOUNTER — APPOINTMENT (OUTPATIENT)
Dept: UROLOGY | Facility: CLINIC | Age: 68
End: 2023-01-06

## 2023-01-06 NOTE — HISTORY OF PRESENT ILLNESS
[FreeTextEntry1] : 66 YO M seen TODAY 1/6/2023 as NPT for urological evaluation. Patient underwent robotic LIH repair 12/15/2022, apparently due to recent placement of an IPP.

## 2023-01-11 ENCOUNTER — APPOINTMENT (OUTPATIENT)
Dept: UROLOGY | Facility: CLINIC | Age: 68
End: 2023-01-11
Payer: MEDICARE

## 2023-01-11 VITALS — WEIGHT: 138 LBS | BODY MASS INDEX: 20.92 KG/M2 | HEIGHT: 68 IN

## 2023-01-11 VITALS
TEMPERATURE: 98.2 F | HEART RATE: 92 BPM | DIASTOLIC BLOOD PRESSURE: 74 MMHG | OXYGEN SATURATION: 87 % | SYSTOLIC BLOOD PRESSURE: 117 MMHG

## 2023-01-11 PROCEDURE — 99214 OFFICE O/P EST MOD 30 MIN: CPT

## 2023-01-11 PROCEDURE — 81003 URINALYSIS AUTO W/O SCOPE: CPT | Mod: QW

## 2023-01-11 NOTE — PHYSICAL EXAM
[General Appearance - Well Developed] : well developed [General Appearance - Well Nourished] : well nourished [Normal Appearance] : normal appearance [Well Groomed] : well groomed [General Appearance - In No Acute Distress] : no acute distress [Edema] : no peripheral edema [Respiration, Rhythm And Depth] : normal respiratory rhythm and effort [Exaggerated Use Of Accessory Muscles For Inspiration] : no accessory muscle use [Abdomen Soft] : soft [Abdomen Tenderness] : non-tender [Abdomen Hernia] : no hernia was discovered [Costovertebral Angle Tenderness] : no ~M costovertebral angle tenderness [FreeTextEntry1] : Penile glans split ventrally from the meatus through the corona, The underlying mucosa does not look infected. There is no sign of erosion of the cylinders tips, although they are palpable just beneath the urethral mucosa. The pump is in good position and non tender to palpation. The shaft is non tender to palpation. The scrotum is not swollen and here is no sign of infection. [Normal Station and Gait] : the gait and station were normal for the patient's age [] : no rash [No Focal Deficits] : no focal deficits [Oriented To Time, Place, And Person] : oriented to person, place, and time [Affect] : the affect was normal [Mood] : the mood was normal [Not Anxious] : not anxious

## 2023-01-11 NOTE — HISTORY OF PRESENT ILLNESS
[FreeTextEntry1] : 68 YO M NO SHOW 1/6/2023.\par \par Patient seen TODAY 1/11/2023 as NPT for urological evaluation. Patient underwent open radical prostatectomy 2006 at [facility]. with resultant incontinence and ED. He underwent a sling operation in 2008 - 2009 with beneficial results. He underwent implantation of an IPP 2015 which worked well until 2021 - 2022. His incontinence also got worse after this IPP was implanted in 2015. He then underwent replacement of the IPP with a now IPP about 6 weeks ago. Shortly there after he developed a LIH.  He underwent robotic LIH repair 12/15/2022, apparently due to recent placement of his IPP.  He apparently had a Beck catheter in place for 2 weeks after the hernia repair and this caused erosion and splitting of the ventral glans penis.  He continues to leak urine but comes today due to the split glans. He has been caring for this by wrapping the glans in a gauze pad after applying antibiotic ointment and steroid cream.  The above history is not documented by any records except for the op report from the robotic LIH performed on 12/22/2022.  He told me that he has been able to inflate the IPP and has used it x 1 - 2. \par UA trace RBC.\par IPSS 9\par SHEY 112\par YISSEL 6

## 2023-01-11 NOTE — ASSESSMENT
[FreeTextEntry1] : We discussed the patient's complex urological history and I recommended that he continue under the care of his operating surgeons until he heals from these issues.  As for the ventral penile erosion, this is of concern and that it does increase the risk of protrusion of the distal tips of the newly implanted prosthesis.  We discussed options to minimize this occurrence and I recommended that he should consider delaying any plans to surgically reconstruct this area until he heals more fully from the prosthesis implant surgery.  As for his urinary incontinence, this will require further evaluation before any attempt at repair or improvement would be made.\par \par As for the trace red blood cells seen today, in view of the distal penile urethral trauma, I did send urine for culture only.  I recommended that he reassess with me in 3 months or as needed but that he continue with his postoperative follow-up with the implanting surgeon later this week.\par \par I also asked the patient to obtain copies of his records for me if he would like me to assume FU care for thes issues or his prostate cancer.  Khushboo Schultz MD\par

## 2023-01-11 NOTE — HISTORY OF PRESENT ILLNESS
[FreeTextEntry1] : 68 YO M NO SHOW 1/6/2023.\par \par Patientseen TODAY 1/6/2023 as NPT for urological evaluation. Patient underwent robotic LIH repair 12/15/2022, apparently due to recent placement of an IPP.

## 2023-01-11 NOTE — LETTER BODY
[Dear  ___] : Dear  [unfilled], [Consult Letter:] : I had the pleasure of evaluating your patient, [unfilled]. [Please see my note below.] : Please see my note below. [Consult Closing:] : Thank you very much for allowing me to participate in the care of this patient.  If you have any questions, please do not hesitate to contact me. [FreeTextEntry3] : Best Regards, \par \par Khushboo Schultz MD\par

## 2023-01-12 ENCOUNTER — NON-APPOINTMENT (OUTPATIENT)
Age: 68
End: 2023-01-12

## 2023-01-13 ENCOUNTER — APPOINTMENT (OUTPATIENT)
Dept: SURGERY | Facility: CLINIC | Age: 68
End: 2023-01-13
Payer: MEDICARE

## 2023-01-13 LAB — BACTERIA UR CULT: NORMAL

## 2023-01-13 RX ORDER — OXYBUTYNIN CHLORIDE 15 MG/1
15 TABLET, EXTENDED RELEASE ORAL DAILY
Qty: 30 | Refills: 3 | Status: ACTIVE | COMMUNITY
Start: 2023-01-13 | End: 1900-01-01

## 2023-01-20 ENCOUNTER — APPOINTMENT (OUTPATIENT)
Dept: SURGERY | Facility: CLINIC | Age: 68
End: 2023-01-20
Payer: MEDICARE

## 2023-01-20 VITALS
HEIGHT: 68 IN | BODY MASS INDEX: 20.92 KG/M2 | SYSTOLIC BLOOD PRESSURE: 110 MMHG | OXYGEN SATURATION: 98 % | DIASTOLIC BLOOD PRESSURE: 82 MMHG | HEART RATE: 79 BPM | WEIGHT: 138 LBS

## 2023-01-20 PROCEDURE — 99024 POSTOP FOLLOW-UP VISIT: CPT

## 2023-01-24 NOTE — PHYSICAL EXAM
[Respiratory Effort] : normal respiratory effort [Alert] : alert [Oriented to Person] : oriented to person [Oriented to Place] : oriented to place [Oriented to Time] : oriented to time [Calm] : calm [JVD] : no jugular venous distention  [de-identified] : Well appearing thin man in NAD [de-identified] : NCAT; sclerae anicteric [de-identified] : Supple [de-identified] : Soft; nondistended. NOntender to palpation. L groin soft and nontender. No evidence of hernia recurrence, seroma, or overlying skin changes. [de-identified] : Warm; dry

## 2023-01-24 NOTE — PLAN
[FreeTextEntry1] : - Cont diet as tolerated\par - Pt was advised to not engage in heavy lifting or strenuous activity for 6 weeks from surgery date\par - Pt to follow up with Urology as planned for new urinary incontinence\par - Return to office as needed

## 2023-01-24 NOTE — ASSESSMENT
[FreeTextEntry1] : 67-year-old man with history of prostate cancer status post prostatectomy and penile implant insertion through left groin incision who developed a left inguinal hernia.  He is now status post repair with mesh.  Doing well with no apparent complications..

## 2023-01-24 NOTE — HISTORY OF PRESENT ILLNESS
[de-identified] : MADISON CHAVEZ is a 67 year man with a history of prostate cancer status post prostatectomy, urethral stricture, and penile implant [facility] in [facility] fall of 2022.  Subsequent to this the patient developed an apparent inguinal hernia in the area and he presented to our emergency department with resulting small bowel obstruction.  The patient underwent robot-assisted repair with mesh and was discharged home in stable condition with a Beck catheter in place and outpatient urology follow-up.  He presents today for follow-up. [de-identified] : Patient has been doing well at home. He denies ongoing groin, abdominal, or testicular pain. Denies nausea, emesis with diet. Has been having normal BM without diarrhea or constipation. There have been no fevers or chills. He also denies any new problems with the wounds or groin swelling. His Beck catheter was removed in office by urology and pt voided though developed some urinary incontinence following this, for which he continues ot see [facility]. Patient has remained active at home.

## 2023-02-03 ENCOUNTER — APPOINTMENT (OUTPATIENT)
Dept: UROLOGY | Facility: CLINIC | Age: 68
End: 2023-02-03
Payer: MEDICARE

## 2023-02-03 VITALS
WEIGHT: 138 LBS | TEMPERATURE: 97.2 F | HEIGHT: 68 IN | SYSTOLIC BLOOD PRESSURE: 113 MMHG | BODY MASS INDEX: 20.92 KG/M2 | DIASTOLIC BLOOD PRESSURE: 80 MMHG | HEART RATE: 80 BPM

## 2023-02-03 DIAGNOSIS — F10.21 ALCOHOL DEPENDENCE, IN REMISSION: ICD-10-CM

## 2023-02-03 DIAGNOSIS — Z78.9 OTHER SPECIFIED HEALTH STATUS: ICD-10-CM

## 2023-02-03 PROCEDURE — 99215 OFFICE O/P EST HI 40 MIN: CPT

## 2023-02-06 PROBLEM — F10.21 ALCOHOLISM IN RECOVERY: Status: ACTIVE | Noted: 2023-02-03

## 2023-02-06 PROBLEM — Z78.9 NON-SMOKER: Status: ACTIVE | Noted: 2023-02-03

## 2023-02-06 LAB
BILIRUB UR QL STRIP: NORMAL
CLARITY UR: CLEAR
COLLECTION METHOD: NORMAL
GLUCOSE UR-MCNC: NORMAL
HCG UR QL: 0.2 EU/DL
HGB UR QL STRIP.AUTO: NORMAL
KETONES UR-MCNC: NORMAL
LEUKOCYTE ESTERASE UR QL STRIP: NORMAL
NITRITE UR QL STRIP: NORMAL
PH UR STRIP: 5.5
PROT UR STRIP-MCNC: NORMAL
SP GR UR STRIP: 1.02

## 2023-02-06 NOTE — HISTORY OF PRESENT ILLNESS
[FreeTextEntry1] : 68 y/o male with a hx of prostate cancer and underwent a radical prostatectomy back in 2006 with development of urinary incontinence and erectile dysfunction. He feels that his incontinence issues has been worsening over the last 8-10 years or so. He apparently also had a sing procedure done in 2008 or 2009. Pt had an IPP implantation in 2015, but it stopped working and had a malfunction in 2021. He had an IPP replacement in Dec. and developed a left inguinal hernia. He then undergone a robotic assisted inguinal repair by Dr. Milligan. Pt had a Beck catheter placed at that time and this caused erosion of the ventral glans penis. He feels that his leakage has worsened to some degree and is going through multiple pads a day. IPSS = 9\par \par pmhx: prostate, left inguinal hernia \par \par pshx: radial prostatectomy, IPP placement and replacement, urethral sling procedure, robotic assisted inguinal repair \par \par allergies: NKDA

## 2023-02-06 NOTE — PHYSICAL EXAM
[General Appearance - Well Nourished] : well nourished [Normal Appearance] : normal appearance [General Appearance - In No Acute Distress] : no acute distress [Abdomen Soft] : soft [Abdomen Tenderness] : non-tender [Abdomen Mass (___ Cm)] : no abdominal mass palpated [Penis Abnormality] : normal circumcised penis [Oriented To Time, Place, And Person] : oriented to person, place, and time [Affect] : the affect was normal [FreeTextEntry1] : Pt's penis is wrapped, the prosthesis is in place and I do not see any erosion of the prosthesis. The penis is leaking some urine and the meatus is large with some erosion. The testes are with no masses and the prosthesis pump is in place.

## 2023-02-23 ENCOUNTER — APPOINTMENT (OUTPATIENT)
Dept: UROLOGY | Facility: CLINIC | Age: 68
End: 2023-02-23
Payer: MEDICARE

## 2023-02-23 LAB
BILIRUB UR QL STRIP: NORMAL
CLARITY UR: CLEAR
COLLECTION METHOD: NORMAL
GLUCOSE UR-MCNC: NORMAL
HCG UR QL: 0.2 EU/DL
HGB UR QL STRIP.AUTO: NORMAL
KETONES UR-MCNC: NORMAL
LEUKOCYTE ESTERASE UR QL STRIP: NORMAL
NITRITE UR QL STRIP: NORMAL
PH UR STRIP: 5
PROT UR STRIP-MCNC: NORMAL
SP GR UR STRIP: 1.02

## 2023-02-23 PROCEDURE — 81003 URINALYSIS AUTO W/O SCOPE: CPT | Mod: QW

## 2023-02-23 PROCEDURE — 99213 OFFICE O/P EST LOW 20 MIN: CPT

## 2023-02-23 NOTE — LETTER BODY
[Dear  ___] : Dear  [unfilled], [Courtesy Letter:] : I had the pleasure of seeing your patient, [unfilled], in my office today. [Please see my note below.] : Please see my note below. [Consult Closing:] : Thank you very much for allowing me to participate in the care of this patient.  If you have any questions, please do not hesitate to contact me. [FreeTextEntry3] : Best Regards, \par \par Khushboo Schultz MD\par

## 2023-02-23 NOTE — PHYSICAL EXAM
[General Appearance - Well Developed] : well developed [General Appearance - Well Nourished] : well nourished [Normal Appearance] : normal appearance [Well Groomed] : well groomed [General Appearance - In No Acute Distress] : no acute distress [Oriented To Time, Place, And Person] : oriented to person, place, and time [Affect] : the affect was normal [Mood] : the mood was normal [Not Anxious] : not anxious [Epididymis] : the epididymides were normal [Testes Tenderness] : no tenderness of the testes [Testes Mass (___cm)] : there were no testicular masses [FreeTextEntry1] : Glans is split ventrally onto the ventral penile shaft, prior inflammation is improved. IPP pump is in the scrotum and there is no sign of swelling or pain.

## 2023-02-23 NOTE — ASSESSMENT
[FreeTextEntry1] : We discussed his extensive surgical history including his recent hernia repair, and the complexity of his case, putting him at high risk for complications. I recommend that he wait a few more months to heal from his hernia surgery before considering any intervention for his urinary incontinence. We discussed that he needs to send me his prior surgical records for my review.  He is insistent on having an artificial sphincter placement for the urinary incontinence as this is disruptive to daily activities, and I have referred him to my partner,  Dr. Good Hawley, who specializes in such complex cases,  to evaluate and treat him. Khushboo Schultz MD\par

## 2023-02-23 NOTE — HISTORY OF PRESENT ILLNESS
[FreeTextEntry1] : 68 YO M NO SHOW 1/6/2023.\par \par Patient seen 1/11/2023 as NPT for urological evaluation. Patient underwent open radical prostatectomy 2006 at [facility]. with resultant incontinence and ED. He underwent a sling operation in 2008 - 2009 with beneficial results. He underwent implantation of an IPP 2015 which worked well until 2021 - 2022. His incontinence also got worse after this IPP was implanted in 2015. He then underwent replacement of the IPP with a now IPP about 6 weeks ago. Shortly there after he developed a LIH.  He underwent robotic LIH repair 12/15/2022, apparently due to recent placement of his IPP.  He apparently had a Beck catheter in place for 2 weeks after the hernia repair and this caused erosion and splitting of the ventral glans penis.  He continues to leak urine but comes today due to the split glans. He has been caring for this by wrapping the glans in a gauze pad after applying antibiotic ointment and steroid cream.  The above history is not documented by any records except for the op report from the robotic LIH performed on 12/22/2022.  He told me that he has been able to inflate the IPP and has used it x 1 - 2. \par UA trace RBC.\par IPSS 9\par SHEY 112\par YISSEL 6\par \par We discussed the patient's complex urological history and I recommended that he continue under the care of his operating surgeons until he heals from these issues.  As for the ventral penile erosion, this is of concern and that it does increase the risk of protrusion of the distal tips of the newly implanted prosthesis.  We discussed options to minimize this occurrence and I recommended that he should consider delaying any plans to surgically reconstruct this area until he heals more fully from the prosthesis implant surgery.  As for his urinary incontinence, this will require further evaluation before any attempt at repair or improvement would be made.\par \par As for the trace red blood cells seen today, in view of the distal penile urethral trauma, I did send urine for culture only.  I recommended that he reassess with me in 3 months or as needed but that he continue with his postoperative follow-up with the implanting surgeon later this week.\par \par I also asked the patient to obtain copies of his records for me if he would like me to assume FU care for these issues or his prostate cancer. \par \par Patient seen TODAY 2/23/2023 to reassess. He continues to have urinary incontinence and wears pads daily. He did not bring his prior records from his prior surgeries for my review as requested. He was evaluated by Dr. Ortiz a few weeks ago for his urinary incontinence and recently implanted IPP, who would like to wait 3 months before proceeding with any intervention, given his extensive history of pelvic surgeries.\par \par UA trace RBC, small WBC\par IPSS 14\par

## 2023-03-02 ENCOUNTER — NON-APPOINTMENT (OUTPATIENT)
Age: 68
End: 2023-03-02

## 2023-03-02 LAB — BACTERIA UR CULT: ABNORMAL

## 2023-03-16 ENCOUNTER — APPOINTMENT (OUTPATIENT)
Dept: UROLOGY | Facility: CLINIC | Age: 68
End: 2023-03-16

## 2023-04-21 ENCOUNTER — APPOINTMENT (OUTPATIENT)
Dept: UROLOGY | Facility: CLINIC | Age: 68
End: 2023-04-21
Payer: MEDICARE

## 2023-04-21 ENCOUNTER — NON-APPOINTMENT (OUTPATIENT)
Age: 68
End: 2023-04-21

## 2023-04-21 VITALS — SYSTOLIC BLOOD PRESSURE: 102 MMHG | TEMPERATURE: 97.8 F | HEART RATE: 90 BPM | DIASTOLIC BLOOD PRESSURE: 71 MMHG

## 2023-04-21 PROCEDURE — 99213 OFFICE O/P EST LOW 20 MIN: CPT

## 2023-04-21 NOTE — ASSESSMENT
[FreeTextEntry1] : ADAN\par fialed sling\par severe bother\par strong interest in AUS\par refer to robert

## 2023-04-21 NOTE — HISTORY OF PRESENT ILLNESS
[FreeTextEntry1] : hx open RP 2006\par s/p IPP and sling\par [facility]\par thereafter underwent herniorraphy\par 3-4 ppd incontineucne\par severe bother\par symptoms markedly worse at implant revision

## 2023-04-26 ENCOUNTER — APPOINTMENT (OUTPATIENT)
Dept: UROLOGY | Facility: CLINIC | Age: 68
End: 2023-04-26
Payer: MEDICARE

## 2023-04-26 VITALS
SYSTOLIC BLOOD PRESSURE: 145 MMHG | HEART RATE: 95 BPM | OXYGEN SATURATION: 99 % | DIASTOLIC BLOOD PRESSURE: 77 MMHG | TEMPERATURE: 97 F

## 2023-04-26 PROCEDURE — 99215 OFFICE O/P EST HI 40 MIN: CPT

## 2023-04-27 NOTE — PHYSICAL EXAM
[General Appearance - Well Developed] : well developed [General Appearance - Well Nourished] : well nourished [Normal Appearance] : normal appearance [Well Groomed] : well groomed [General Appearance - In No Acute Distress] : no acute distress [Respiration, Rhythm And Depth] : normal respiratory rhythm and effort [Exaggerated Use Of Accessory Muscles For Inspiration] : no accessory muscle use [Abdomen Soft] : soft [Abdomen Tenderness] : non-tender [] : no hepato-splenomegaly [Costovertebral Angle Tenderness] : no ~M costovertebral angle tenderness [FreeTextEntry1] : IPP implant in place, pump palpable in the scrotum.  Patient leaks freely upon standing. [Normal Station and Gait] : the gait and station were normal for the patient's age [Oriented To Time, Place, And Person] : oriented to person, place, and time

## 2023-04-27 NOTE — HISTORY OF PRESENT ILLNESS
[FreeTextEntry1] : 67-year-old gentleman with history of prostate cancer status post open prostatectomy in 2006 with subsequent development of erectile dysfunction and male stress urinary continence.  He had an IPP placed initially along with a sling.  He then had the IPP removed and replaced in January of this year, complaining of persistent stress urinary continence wearing roughly 4 pads per day and is extremely bothered by his incontinence.  He also has incontinence when he has intercourse.  He is desirous of definitive therapy for his incontinence.

## 2023-04-27 NOTE — ASSESSMENT
[FreeTextEntry1] : \par \par Impression/plan: 67-year-old gentleman with post prostatectomy incontinence, failed prior sling.\par \par 1.  Cystoscopy urodynamics.\par 2.  We did have a discussion with regards to the option of artificial urinary sphincter, he is definitely leaning in this direction pending studies above.  All questions were answered.

## 2023-04-27 NOTE — LETTER BODY
[Dear  ___] : Dear  [unfilled], [Consult Letter:] : I had the pleasure of evaluating your patient, [unfilled]. [Please see my note below.] : Please see my note below. [Consult Closing:] : Thank you very much for allowing me to participate in the care of this patient.  If you have any questions, please do not hesitate to contact me. [Sincerely,] : Sincerely, [FreeTextEntry3] : Ioana Ruiz MD\par System Director Urogynecology/FPMRS\par Department of Urology\par [facility] \par   at [facility]\par  of Urology\par [facility] [employer] at [facility]\par

## 2023-05-04 DIAGNOSIS — N39.0 URINARY TRACT INFECTION, SITE NOT SPECIFIED: ICD-10-CM

## 2023-05-04 RX ORDER — CIPROFLOXACIN HYDROCHLORIDE 500 MG/1
500 TABLET, FILM COATED ORAL
Qty: 6 | Refills: 0 | Status: ACTIVE | COMMUNITY
Start: 2023-05-04 | End: 1900-01-01

## 2023-05-05 ENCOUNTER — NON-APPOINTMENT (OUTPATIENT)
Age: 68
End: 2023-05-05

## 2023-06-05 ENCOUNTER — APPOINTMENT (OUTPATIENT)
Dept: UROLOGY | Facility: CLINIC | Age: 68
End: 2023-06-05
Payer: MEDICARE

## 2023-06-05 VITALS
TEMPERATURE: 98.1 F | OXYGEN SATURATION: 98 % | SYSTOLIC BLOOD PRESSURE: 132 MMHG | HEART RATE: 78 BPM | DIASTOLIC BLOOD PRESSURE: 90 MMHG

## 2023-06-05 PROCEDURE — 51741 ELECTRO-UROFLOWMETRY FIRST: CPT

## 2023-06-05 PROCEDURE — 52000 CYSTOURETHROSCOPY: CPT

## 2023-06-05 PROCEDURE — 81003 URINALYSIS AUTO W/O SCOPE: CPT | Mod: QW

## 2023-06-05 PROCEDURE — 51728 CYSTOMETROGRAM W/VP: CPT

## 2023-06-05 PROCEDURE — 51784 ANAL/URINARY MUSCLE STUDY: CPT

## 2023-06-05 PROCEDURE — 51797 INTRAABDOMINAL PRESSURE TEST: CPT

## 2023-06-05 PROCEDURE — 99215 OFFICE O/P EST HI 40 MIN: CPT | Mod: 25

## 2023-06-06 NOTE — ASSESSMENT
[FreeTextEntry1] : \par \par Impression/plan:  67-year-old gentleman with post prostatectomy incontinence, failed prior sling.\par \par 1. Options for management of post-prostatectomy discussed with the patient at length today. Conservative options including pelvic floor physical therapy and exercises, behavioral modification techniques and bladder training, and Cunningham clamp were discussed at length. Surgical options discussed at length with the patient today as well. Surgical options include periurethral bulking agent, male sling, and artificial urinary sphincter. The patient has opted to move forward with an AUS. RBAPC of the procedure was discussed at length in the office today. This risks associated with a male sling including but not limited to bleeding, infection, urethral erosion, urethral injury, fistula formation, pain, persistent or recurrent incontinence, device malfunction, dysfunctional voiding, injury to inflatable penile prosthesis, and urinary retention. After the above discussed at length and all questions answered the patient decided to move forward with the procedure.\par

## 2023-06-06 NOTE — LETTER BODY
[Dear  ___] : Dear  [unfilled], [Courtesy Letter:] : I had the pleasure of seeing your patient, [unfilled], in my office today. [Please see my note below.] : Please see my note below. [Consult Closing:] : Thank you very much for allowing me to participate in the care of this patient.  If you have any questions, please do not hesitate to contact me. [Sincerely,] : Sincerely, [FreeTextEntry3] : Ioana Ruiz MD\par System Director Urogynecology/FPMRS\par Department of Urology\par [facility] \par   at [facility]\par  of Urology\par [facility] [employer] at [facility]\par

## 2023-06-06 NOTE — HISTORY OF PRESENT ILLNESS
[FreeTextEntry1] : 67-year-old gentleman with history of prostate cancer status post open prostatectomy in 2006 with subsequent development of erectile dysfunction and male stress urinary continence. He had an IPP placed initially along with a sling. He then had the IPP removed and replaced in January of this year, complaining of persistent stress urinary continence wearing roughly 4 pads per day and is extremely bothered by his incontinence. He also has incontinence when he has intercourse. He is desirous of definitive therapy for his incontinence. \par  \par 6/6/23\par \par 67-year-old gentleman with post prostatectomy incontinence, failed a prior male sling, here today for cystoscopy, urodynamics, review of results, discussion of treatment options.  He denies any interval change medical surgical history since his last office visit.\par \par UDS - \par \par Filling/Storage Phase: First sensation 45 mL, First desire 99 mL, Normal desire 312 mL and Cystometric capacity 376 mL. Involuntary contractions were not present . Pt demonstrated stress urinary incontinence. Pt did not demonstrate urge urinary incontinence. VLPP 38 cm/H20. Compliance: normal. EMG Activity: normal. \par \par Voiding Phase: Qmax 4.1 mL/s , Pdet at Qmax 40 cm/H20, Qmax at Pdet 3.0 mL/s, Pavg 35.7 cm/H20, Qavg 2.2 mL/s, voiding time 1:54 mm:sec, voided volume 280 mL and post void residual 76 mL. EMG activity was synergic. \par \par Urodynamic Interpretation : \par Normal bladder sensation. Normal bladder capacity. Patient experiencing no detrusor instability. The patient has severe  stress incontinence. The uroflow rate is decreased. The uroflow pattern is plateaued. The detrusor contractility is normal. The patient has no bladder outlet obstruction. The intravesical voiding pressure is normal. The patient has incomplete bladder emptying, a post void residual of 76 cc. The spincter activity is normal synergic. \par \par Cysto - poor EUS contraction.  Enbrel Counseling:  I discussed with the patient the risks of etanercept including but not limited to myelosuppression, immunosuppression, autoimmune hepatitis, demyelinating diseases, lymphoma, and infections.  The patient understands that monitoring is required including a PPD at baseline and must alert us or the primary physician if symptoms of infection or other concerning signs are noted.

## 2023-06-07 LAB
BILIRUB UR QL STRIP: NEGATIVE
CLARITY UR: CLEAR
COLLECTION METHOD: NORMAL
GLUCOSE UR-MCNC: NEGATIVE
HCG UR QL: 0.2 EU/DL
HGB UR QL STRIP.AUTO: NEGATIVE
KETONES UR-MCNC: NEGATIVE
LEUKOCYTE ESTERASE UR QL STRIP: NEGATIVE
NITRITE UR QL STRIP: NEGATIVE
PH UR STRIP: 5.5
PROT UR STRIP-MCNC: NORMAL
SP GR UR STRIP: 1.03

## 2023-06-19 LAB — BACTERIA UR CULT: ABNORMAL

## 2023-06-27 ENCOUNTER — APPOINTMENT (OUTPATIENT)
Dept: UROLOGY | Facility: CLINIC | Age: 68
End: 2023-06-27
Payer: MEDICARE

## 2023-06-27 VITALS
BODY MASS INDEX: 20.92 KG/M2 | WEIGHT: 138 LBS | DIASTOLIC BLOOD PRESSURE: 89 MMHG | HEART RATE: 70 BPM | HEIGHT: 68 IN | SYSTOLIC BLOOD PRESSURE: 131 MMHG

## 2023-06-27 DIAGNOSIS — S37.30XA UNSPECIFIED INJURY OF URETHRA, INITIAL ENCOUNTER: ICD-10-CM

## 2023-06-27 DIAGNOSIS — C61 MALIGNANT NEOPLASM OF PROSTATE: ICD-10-CM

## 2023-06-27 DIAGNOSIS — N52.01 ERECTILE DYSFUNCTION DUE TO ARTERIAL INSUFFICIENCY: ICD-10-CM

## 2023-06-27 DIAGNOSIS — Z00.00 ENCOUNTER FOR GENERAL ADULT MEDICAL EXAMINATION W/OUT ABNORMAL FINDINGS: ICD-10-CM

## 2023-06-27 PROCEDURE — 81003 URINALYSIS AUTO W/O SCOPE: CPT | Mod: QW

## 2023-06-27 PROCEDURE — 99213 OFFICE O/P EST LOW 20 MIN: CPT

## 2023-06-28 LAB
BILIRUB UR QL STRIP: NORMAL
COLLECTION METHOD: NORMAL
GLUCOSE UR-MCNC: NORMAL
HCG UR QL: 0.2 EU/DL
HGB UR QL STRIP.AUTO: NORMAL
KETONES UR-MCNC: NORMAL
LEUKOCYTE ESTERASE UR QL STRIP: NORMAL
NITRITE UR QL STRIP: NORMAL
PH UR STRIP: 5.5
PROT UR STRIP-MCNC: NORMAL
SP GR UR STRIP: 1.02

## 2023-06-30 NOTE — ASSESSMENT
[FreeTextEntry1] : 67 year old male with a history of prostate cancer s/p radical prostatectomy who developed urinary incontinence and erectile dysfunction. He has been seeing Dr. Ruiz and is currently scheduled for an AUS procedure in July. We have discussed that he should follow up with Dr. Ruiz if he has any questions prior to procedure. All of his questions were otherwise answered. \par Pt seen with JASSON Valle. Agree with above. Total time=20 min.

## 2023-06-30 NOTE — HISTORY OF PRESENT ILLNESS
[FreeTextEntry1] : 67 year old male with a history of prostate cancer s/p radical prostatectomy with urinary incontinence. Patient has undergone cystoscopy and urodynamics with Dr. Ruiz and is scheduled for AUS on 7/10/2023.

## 2023-07-21 ENCOUNTER — OUTPATIENT (OUTPATIENT)
Dept: OUTPATIENT SERVICES | Facility: HOSPITAL | Age: 68
LOS: 1 days | End: 2023-07-21
Payer: SELF-PAY

## 2023-07-21 DIAGNOSIS — K02.9 DENTAL CARIES, UNSPECIFIED: ICD-10-CM

## 2023-07-21 PROCEDURE — D0210: CPT

## 2023-07-21 PROCEDURE — D0140: CPT

## 2023-07-21 NOTE — ASU PATIENT PROFILE, ADULT - FALL HARM RISK - RISK INTERVENTIONS

## 2023-07-21 NOTE — ASU PATIENT PROFILE, ADULT - NSICDXPASTMEDICALHX_GEN_ALL_CORE_FT
PAST MEDICAL HISTORY:  Arthritis     Dyslipidemia     Emphysema lung     Heartburn     HTN (hypertension)     Migraine     Neuropathy     Prostate CA for 12 years

## 2023-07-21 NOTE — ASU PATIENT PROFILE, ADULT - NS PREOP UNDERSTANDS INFO
No solid/dairy/candy/gum after midnight Sunday, water allowed before 07:00am Monday, patient reminded to bring photo ID/insurance card, no jewelries/contact lens/valuables, dress in comfortable clothes, no smoking/alcohol/recreational drug use Sunday, escort to have a photo ID. Address and callback number was given./yes

## 2023-07-23 ENCOUNTER — TRANSCRIPTION ENCOUNTER (OUTPATIENT)
Age: 68
End: 2023-07-23

## 2023-07-24 ENCOUNTER — APPOINTMENT (OUTPATIENT)
Dept: UROLOGY | Facility: AMBULATORY SURGERY CENTER | Age: 68
End: 2023-07-24

## 2023-07-24 ENCOUNTER — TRANSCRIPTION ENCOUNTER (OUTPATIENT)
Age: 68
End: 2023-07-24

## 2023-07-24 ENCOUNTER — OUTPATIENT (OUTPATIENT)
Dept: OUTPATIENT SERVICES | Facility: HOSPITAL | Age: 68
LOS: 1 days | Discharge: HOME | End: 2023-07-24
Payer: MEDICARE

## 2023-07-24 VITALS
DIASTOLIC BLOOD PRESSURE: 81 MMHG | RESPIRATION RATE: 16 BRPM | SYSTOLIC BLOOD PRESSURE: 130 MMHG | TEMPERATURE: 99 F | OXYGEN SATURATION: 100 % | HEART RATE: 55 BPM

## 2023-07-24 VITALS
DIASTOLIC BLOOD PRESSURE: 98 MMHG | HEART RATE: 79 BPM | SYSTOLIC BLOOD PRESSURE: 134 MMHG | HEIGHT: 68 IN | WEIGHT: 130.07 LBS | OXYGEN SATURATION: 100 % | RESPIRATION RATE: 16 BRPM | TEMPERATURE: 98 F

## 2023-07-24 DIAGNOSIS — Z98.890 OTHER SPECIFIED POSTPROCEDURAL STATES: Chronic | ICD-10-CM

## 2023-07-24 DIAGNOSIS — Z90.79 ACQUIRED ABSENCE OF OTHER GENITAL ORGAN(S): Chronic | ICD-10-CM

## 2023-07-24 PROCEDURE — 53445 INSERT URO/VES NCK SPHINCTER: CPT

## 2023-07-24 DEVICE — PROSTH SPHIN URNY 61-70CM H2O: Type: IMPLANTABLE DEVICE | Status: FUNCTIONAL

## 2023-07-24 DEVICE — KIT ACCY URINARY CONTROL AMS 800 W/INHIBIZONE: Type: IMPLANTABLE DEVICE | Status: FUNCTIONAL

## 2023-07-24 DEVICE — CUFF ST 4.5 WITH 1Z: Type: IMPLANTABLE DEVICE | Status: FUNCTIONAL

## 2023-07-24 DEVICE — PUMP CONTROL: Type: IMPLANTABLE DEVICE | Status: FUNCTIONAL

## 2023-07-24 RX ORDER — DOCUSATE SODIUM 100 MG
1 CAPSULE ORAL
Qty: 28 | Refills: 0
Start: 2023-07-24 | End: 2023-08-06

## 2023-07-24 RX ORDER — FAMOTIDINE 10 MG/ML
1 INJECTION INTRAVENOUS
Qty: 0 | Refills: 0 | DISCHARGE

## 2023-07-24 RX ORDER — AMLODIPINE BESYLATE 2.5 MG/1
1 TABLET ORAL
Qty: 0 | Refills: 0 | DISCHARGE

## 2023-07-24 RX ORDER — OXYCODONE HYDROCHLORIDE 5 MG/1
5 TABLET ORAL ONCE
Refills: 0 | Status: DISCONTINUED | OUTPATIENT
Start: 2023-07-24 | End: 2023-07-24

## 2023-07-24 RX ORDER — PHENAZOPYRIDINE HCL 100 MG
100 TABLET ORAL ONCE
Refills: 0 | Status: DISCONTINUED | OUTPATIENT
Start: 2023-07-24 | End: 2023-07-24

## 2023-07-24 RX ORDER — ZOLPIDEM TARTRATE 10 MG/1
12.5 TABLET ORAL
Qty: 0 | Refills: 0 | DISCHARGE

## 2023-07-24 RX ORDER — MONTELUKAST 4 MG/1
1 TABLET, CHEWABLE ORAL
Qty: 0 | Refills: 0 | DISCHARGE

## 2023-07-24 RX ORDER — OXYCODONE AND ACETAMINOPHEN 5; 325 MG/1; MG/1
1 TABLET ORAL
Qty: 5 | Refills: 0
Start: 2023-07-24

## 2023-07-24 RX ORDER — KETOROLAC TROMETHAMINE 30 MG/ML
15 SYRINGE (ML) INJECTION EVERY 6 HOURS
Refills: 0 | Status: DISCONTINUED | OUTPATIENT
Start: 2023-07-24 | End: 2023-07-24

## 2023-07-24 RX ORDER — FINASTERIDE 5 MG/1
1 TABLET, FILM COATED ORAL
Qty: 0 | Refills: 0 | DISCHARGE

## 2023-07-24 RX ORDER — TAMSULOSIN HYDROCHLORIDE 0.4 MG/1
1 CAPSULE ORAL
Qty: 0 | Refills: 0 | DISCHARGE

## 2023-07-24 RX ORDER — SODIUM CHLORIDE 9 MG/ML
500 INJECTION, SOLUTION INTRAVENOUS
Refills: 0 | Status: DISCONTINUED | OUTPATIENT
Start: 2023-07-24 | End: 2023-07-24

## 2023-07-24 RX ORDER — VANCOMYCIN HCL 1 G
1000 VIAL (EA) INTRAVENOUS ONCE
Refills: 0 | Status: COMPLETED | OUTPATIENT
Start: 2023-07-24 | End: 2023-07-24

## 2023-07-24 RX ORDER — GENTAMICIN SULFATE 40 MG/ML
240 VIAL (ML) INJECTION ONCE
Refills: 0 | Status: COMPLETED | OUTPATIENT
Start: 2023-07-24 | End: 2023-07-24

## 2023-07-24 RX ORDER — SODIUM CHLORIDE 9 MG/ML
500 INJECTION, SOLUTION INTRAVENOUS ONCE
Refills: 0 | Status: DISCONTINUED | OUTPATIENT
Start: 2023-07-24 | End: 2023-07-24

## 2023-07-24 RX ORDER — GABAPENTIN 400 MG/1
1 CAPSULE ORAL
Qty: 0 | Refills: 0 | DISCHARGE

## 2023-07-24 RX ORDER — ACETAMINOPHEN 500 MG
650 TABLET ORAL ONCE
Refills: 0 | Status: DISCONTINUED | OUTPATIENT
Start: 2023-07-24 | End: 2023-07-24

## 2023-07-24 RX ORDER — FENTANYL CITRATE 50 UG/ML
50 INJECTION INTRAVENOUS
Refills: 0 | Status: DISCONTINUED | OUTPATIENT
Start: 2023-07-24 | End: 2023-07-24

## 2023-07-24 RX ORDER — HYDROMORPHONE HYDROCHLORIDE 2 MG/ML
0.5 INJECTION INTRAMUSCULAR; INTRAVENOUS; SUBCUTANEOUS
Refills: 0 | Status: DISCONTINUED | OUTPATIENT
Start: 2023-07-24 | End: 2023-07-24

## 2023-07-24 RX ADMIN — Medication 200 MILLIGRAM(S): at 11:21

## 2023-07-24 RX ADMIN — OXYCODONE HYDROCHLORIDE 5 MILLIGRAM(S): 5 TABLET ORAL at 15:11

## 2023-07-24 RX ADMIN — Medication 250 MILLIGRAM(S): at 10:00

## 2023-07-24 RX ADMIN — SODIUM CHLORIDE 50 MILLILITER(S): 9 INJECTION, SOLUTION INTRAVENOUS at 09:58

## 2023-07-24 NOTE — ASU DISCHARGE PLAN (ADULT/PEDIATRIC) - CARE PROVIDER_API CALL
Ioana Ruiz  Urology  [address] [place] [id number]  Phone: [phone]  Fax: [phone]  Follow Up Time: 1 week   Ioana Ruiz  Urology  [address] [place] [id number]  Phone: [phone]  Fax: [phone]  Follow Up Time: 1 month

## 2023-07-24 NOTE — ASU DISCHARGE PLAN (ADULT/PEDIATRIC) - NS MD DC FALL RISK RISK
For information on Fall & Injury Prevention, visit: [email or web address] OR  [email or web address] OR  https://www.cdc.gov/steadi/patient.html

## 2023-07-24 NOTE — PRE-ANESTHESIA EVALUATION ADULT - NSANTHADDINFOFT_GEN_ALL_CORE
Pt accepts all anesthesia risks IBNLT: Dental, Pharyngeal, Laryngeal, Tracheal Trauma, Sore Throat, Hoarseness, Recurrent Laryngeal Nerve Dysfunction, Upper and Lower Extremity Paresthesias, Nausea and Vomiting, Potential exacerbation of asthma and CARMITA.

## 2023-07-24 NOTE — ASU DISCHARGE PLAN (ADULT/PEDIATRIC) - ASU DC SPECIAL INSTRUCTIONSFT
ARTIFICIAL URINARY SPHINCTER    SURGICAL WOUND: There are often lumps and bumps that can be felt in the perineum, scrotum, and lower abdomen for up to two (2) months or more post operatively. These are of no concern and with time they will soften and disappear.  Any “black and blue” bruising areas will also resolve.  Sometimes the tissue fluid which causes the swelling migrates to the penile skin and can look alarming; with time, all the swelling will eventually subside but may take weeks.  A scrotal support and scrotal fluffs should be worn at all times for the next few weeks, unless bathing, to minimize this swelling. You may apply an ice-pack for 15 minutes out of every hour for the first 24 -36 hours to minimize pain and swelling.    STITCHES: The stitches in the incision will dissolve and fall out by themselves. Sometimes skin stitches may open, allowing a slight gaping of the incision. This is no problem if you keep the area clean.  There is a bluish colored waterproof glue over the incision as well – the glue will peel away and fall off on its own over a couple of weeks.      CATHETER: Some patients are sent home with a Beck catheter, while others go home urinating on their own. A Beck catheter continuously drains the urine from the bladder. If you still have a catheter, the nurses will review instructions and care before you go home. For men, you may have a prescription for lidocaine jelly to apply to the tip of your penis, as needed, for catheter related discomfort.     PAIN: You may have some intermittent pain for up to six (6) weeks post operatively. Pain does not signify any problem unless associated with fever, chills, or inability to void.  If you experience any fevers or chills please call immediately as this may be signs of an infection. You may take Tylenol (acetaminophen) 650-975mg and/or Motrin (ibuprofen) 400-600mg, both available over the counter, for pain every 6 hours as needed. Do not exceed 4000mg of Tylenol (acetaminophen) daily. You may alternate these medications such that you take one or the other every 3 hours for around the clock pain coverage. For severe pain, take Percocet 5/325mg every 6 hours. For your convenience, all prescriptions are sent to [facility] pharmacy at [address] [place] [address], on the corner of [address] and [address] [place] [address].    ANTIBIOTICS: You may be given a prescription for an antibiotic, please take this medication as instructed and be sure to complete the entire course. For your convenience, all prescriptions are sent to [facility] pharmacy at [facility] [address], on the corner of [address].    STOOL SOFTENERS: Do not allow yourself to become constipated as straining may cause bleeding. Take stool softeners or a laxative (ex. Miralax, Colace, Senokot, ExLax, etc), available over the counter, if taking Percocet. For your convenience, all prescriptions are sent to [facility] pharmacy at [facility] [address], on the corner of [address].    ANTICOAGULATION: If you are taking any blood thinning medications, please discuss with your urologist prior to restarting these medications unless otherwise specified.    BATHING: You may sponge bath 24 hours after surgery, but minimize water to the surgical incision and drain.    DIET: You may resume your regular diet and regular medication regimen.    ACTIVITY: No heavy lifting or strenuous exercise until you are evaluated at your post-operative appointment. Otherwise, you may return to your usual level of physical activity.    FOLLOW-UP: If you did not already schedule your post-operative appointment, please call your urologist to schedule and follow-up appointment.    CALL YOUR UROLOGIST IF: You have any bleeding that does not stop, inability to void >8 hours, fever over 100.4 F, chills, persistent nausea/vomiting, changes in your incision concerning for infection, or if your pain is not controlled on your discharge pain medications. ARTIFICIAL URINARY SPHINCTER    SURGICAL WOUND: There are often lumps and bumps that can be felt in the perineum, scrotum, and lower abdomen for up to two (2) months or more post operatively. These are of no concern and with time they will soften and disappear.  Any “black and blue” bruising areas will also resolve.  Sometimes the tissue fluid which causes the swelling migrates to the penile skin and can look alarming; with time, all the swelling will eventually subside but may take weeks.  A scrotal support and scrotal fluffs should be worn at all times for the next week, unless bathing, to minimize this swelling. You may apply an ice-pack for 15 minutes out of every hour for the first 24 -36 hours to minimize pain and swelling.     STITCHES: The stitches in the incision will dissolve and fall out by themselves. Sometimes skin stitches may open, allowing a slight gaping of the incision. This is no problem if you keep the area clean.  There is a bluish colored waterproof glue over the incision as well – the glue will peel away and fall off on its own over a couple of weeks.      PAIN: You may have some intermittent pain for up to six (6) weeks post operatively. Pain does not signify any problem unless associated with fever, chills, or inability to void.  If you experience any fevers or chills please call immediately as this may be signs of an infection. You may take Tylenol (acetaminophen) 650-975mg and/or Motrin (ibuprofen) 400-600mg, both available over the counter, for pain every 6 hours as needed. Do not exceed 4000mg of Tylenol (acetaminophen) daily. You may alternate these medications such that you take one or the other every 3 hours for around the clock pain coverage. For severe pain, take Percocet 5/325mg every 8 hours.     ANTIBIOTICS: You are given a prescription for Augmentin every 12 hours for 7 days. Please finish all pills.    STOOL SOFTENERS: Do not allow yourself to become constipated as straining may cause bleeding. Take stool softeners or a laxative (ex. Miralax, Colace, Senokot, ExLax, etc), available over the counter, if taking Percocet.     ANTICOAGULATION: If you are taking any blood thinning medications, please discuss with your urologist prior to restarting these medications unless otherwise specified.    BATHING: You may sponge bath 24 hours after surgery, but minimize water to the surgical incision    DIET: You may resume your regular diet and regular medication regimen.    ACTIVITY: No heavy lifting or strenuous exercise until you are evaluated at your post-operative appointment. Otherwise, you may return to your usual level of physical activity.    FOLLOW-UP: If you did not already schedule your post-operative appointment, please call your urologist to schedule and follow-up appointment.    CALL YOUR UROLOGIST IF: You have any bleeding that does not stop, inability to void >8 hours, fever over 100.4 F, chills, persistent nausea/vomiting, changes in your incision concerning for infection, or if your pain is not controlled on your discharge pain medications.

## 2023-07-24 NOTE — ASU DISCHARGE PLAN (ADULT/PEDIATRIC) - PROVIDER TOKENS
PROVIDER:[TOKEN:[[id number]],FOLLOWUP:[1 week]] PROVIDER:[TOKEN:[[id number]],FOLLOWUP:[1 month]]

## 2023-07-24 NOTE — PRE-ANESTHESIA EVALUATION ADULT - NSANTHOSAYNRD_GEN_A_CORE
No. CARMITA screening performed.  STOP BANG Legend: 0-2 = LOW Risk; 3-4 = INTERMEDIATE Risk; 5-8 = HIGH Risk

## 2023-07-24 NOTE — BRIEF OPERATIVE NOTE - OPERATION/FINDINGS
Perineum incision 4 cm. 4.5 cm AUS placed. Perineum incision 4 cm. 4.5 cm AUS placed. Right suprainguinal incision for placement of reservoir. pump placed in right scrotum. No exposure of IPP. Closed with sutures and dermabond. Flex cysto at end of case no visible urethral damage. Beck removed.

## 2023-08-01 DIAGNOSIS — K08.409 PARTIAL LOSS OF TEETH, UNSPECIFIED CAUSE, UNSPECIFIED CLASS: ICD-10-CM

## 2023-08-01 PROBLEM — G43.909 MIGRAINE, UNSPECIFIED, NOT INTRACTABLE, WITHOUT STATUS MIGRAINOSUS: Chronic | Status: ACTIVE | Noted: 2023-07-24

## 2023-08-01 PROBLEM — R12 HEARTBURN: Chronic | Status: ACTIVE | Noted: 2023-07-24

## 2023-08-23 ENCOUNTER — APPOINTMENT (OUTPATIENT)
Dept: UROLOGY | Facility: CLINIC | Age: 68
End: 2023-08-23

## 2023-08-30 ENCOUNTER — APPOINTMENT (OUTPATIENT)
Dept: UROLOGY | Facility: CLINIC | Age: 68
End: 2023-08-30
Payer: MEDICARE

## 2023-08-30 VITALS
TEMPERATURE: 97.5 F | OXYGEN SATURATION: 98 % | DIASTOLIC BLOOD PRESSURE: 80 MMHG | SYSTOLIC BLOOD PRESSURE: 116 MMHG | HEART RATE: 71 BPM

## 2023-08-30 DIAGNOSIS — N39.3 STRESS INCONTINENCE (FEMALE) (MALE): ICD-10-CM

## 2023-08-30 PROCEDURE — 99024 POSTOP FOLLOW-UP VISIT: CPT

## 2023-08-30 NOTE — BRIEF OPERATIVE NOTE - NSICDXBRIEFPROCEDURE_GEN_ALL_CORE_FT
PRE-OP DIAGNOSIS:  Endophthalmitis, left 30-Aug-2023 17:44:08  Kennedy Rowley  
PROCEDURES:  Dilation, urethral stricture, by urethral dilator, male, initial 19-Dec-2022 14:35:55  Axel WU  
PROCEDURES:  Robot-assisted repair of left inguinal hernia using da Malena Xi 19-Dec-2022 15:14:20  Osiel Trinh

## 2023-08-30 NOTE — HISTORY OF PRESENT ILLNESS
[FreeTextEntry1] : 67-year-old gentleman who is over a month status post placement of an artificial urinary sphincter.  He is doing well, denies any pain.  He is here today to have his artificial urinary sphincter activated.  He is still having incontinence which is expected as the device is not activated as of yet.  On exam, all incisions have healed well.  Pump is sitting well in the right hemiscrotum, it is activated without difficulty.  Patient is dry with cough stress test.  Patient was able to demonstrate facility with using his pump.  Brochure and teaching with explanation was given.  Plan:  Resume all activities.   Follow-up as needed.

## 2024-01-03 NOTE — ED PROVIDER NOTE - OBJECTIVE STATEMENT
Detail Level: Detailed C7-year-old male history of dyslipidemia hypertension COPD prostate cancer status postradiation, penile implant status post left inguinal hernia repair and urethral dilation of bladder neck stricture on 1219 presents for evaluation of catheter complication.  Patient reports purulence from the catheter insertion site as well as a tear to the urethral meatus.

## 2024-01-04 NOTE — ED PROVIDER NOTE - IV ALTEPLASE ADMIN OUTSIDE HIDDEN
Problem:  At Risk for Falls  Goal: # Patient does not fall  Outcome: Outcome Met, Continue evaluating goal progress toward completion  Goal: # Takes action to control fall-related risks  Outcome: Outcome Met, Continue evaluating goal progress toward completion  Goal: # Verbalizes understanding of fall risk/precautions  Description: Document education using the patient education activity  Outcome: Outcome Met, Continue evaluating goal progress toward completion show

## 2024-03-01 NOTE — ED ADULT TRIAGE NOTE - DOMESTIC TRAVEL HIGH RISK QUESTION
HR=72 bpm, FORD=830/101 mmhg, SpO2=97.0 %, Resp=12 B/min, EtCO2=21 mmHg, Apnea=10 Seconds, Fide=10 No

## 2024-11-25 NOTE — PROGRESS NOTE ADULT - ASSESSMENT
68 yo male POD 3 s/p robotic LIH repair, urethral dilatation and lancaster insertion. Pt doing well.          Plan:  - advance diet to soft  - if tolerates can DC to home with VNS  - FUP Dr Milligan 2 weeks  - home with lancaster to leg bag  - fup Urology next week      Pt seen and examined with Dr Milligan 66 yo male POD 3 s/p robotic LIH repair, urethral dilatation and lancaster insertion. Pt doing well.          Plan:  - advance diet to soft  - KCL x 1 dose prior to DC  - if tolerates can DC to home with VNS  - FUP Dr Milligan 2 weeks  - home with lancaster to leg bag  - fup Urology next week      Pt seen and examined with Dr Milligan L&D Pacu

## (undated) DEVICE — SUCTION YANKAUER FLEXIBE HI CAPACITY NO VENT

## (undated) DEVICE — DRSG DERMABOND 0.7ML

## (undated) DEVICE — SOL INJ NS 0.9% 500ML 1-PORT

## (undated) DEVICE — LONE STAR ELASTIC STAY HOOK 12MM BLUNT

## (undated) DEVICE — SOL IRR POUR H2O 500ML

## (undated) DEVICE — PACK GENERAL MINOR

## (undated) DEVICE — DRSG TELFA 3 X 8

## (undated) DEVICE — MARKING PEN W RULER

## (undated) DEVICE — WARMING BLANKET UPPER ADULT

## (undated) DEVICE — VESSEL LOOP MINI-BLUE 0.075" X 16"

## (undated) DEVICE — APPLICATOR Q TIP 6" WOOD STEM

## (undated) DEVICE — GOWN XL

## (undated) DEVICE — SUT SILK 0 30" SH

## (undated) DEVICE — FOLEY CATH 2-WAY 14FR 5CC SILICONE ELASTOMER LATEX

## (undated) DEVICE — PREP BETADINE KIT

## (undated) DEVICE — DRSG TEGADERM 4X4.75"

## (undated) DEVICE — DRAPE TOWEL BLUE 17" X 24"

## (undated) DEVICE — SYR LUER LOK 30CC

## (undated) DEVICE — LONE STAR RETRACTOR RING 32.5CM X 18.3CM DISP

## (undated) DEVICE — SYR LUER LOK 10CC

## (undated) DEVICE — DRAPE INSTRUMENT POUCH 6.75" X 11"

## (undated) DEVICE — SUT VICRYL 2-0 27" CT-2 UNDYED

## (undated) DEVICE — SUT MONOCRYL PLUS 4-0 18" PS-2 UNDYED

## (undated) DEVICE — SUT VICRYL PLUS 2-0 27" CT-2 UNDYED

## (undated) DEVICE — DRAPE IRRIGATION POUCH 19X23"

## (undated) DEVICE — PREP CHLORAPREP HI-LITE ORANGE 3ML

## (undated) DEVICE — ELCTR BIPOLAR CORD 12FT

## (undated) DEVICE — BIPOLAR FORCEP SYMMETRY BAYONET STR 8.25" X 1.5MM (BLUE)

## (undated) DEVICE — FOLEY CATH 2-WAY 16FR 5CC LATEX LUBRICATH

## (undated) DEVICE — SUT VICRYL 0 27" CT-2 UNDYED

## (undated) DEVICE — DRAPE SURGICAL #1010

## (undated) DEVICE — TUBING RANGER FLUID IRRIGATION SET DISP

## (undated) DEVICE — DRSG TAPE UMBILICAL COTTON 2" X 30 X 1/8"

## (undated) DEVICE — SUSPENSORY WITH LEG STRAPS XL

## (undated) DEVICE — POSITIONER STRAP ARMBOARD VELCRO TS-30

## (undated) DEVICE — GLV 7.5 PROTEXIS (WHITE)

## (undated) DEVICE — DRSG MASTISOL

## (undated) DEVICE — RETRACTOR DEEP SCROTAL RETRACTION SYSTEM DISP

## (undated) DEVICE — ELCTR GROUNDING PAD ADULT COVIDIEN

## (undated) DEVICE — DRAPE LAVH 124" X 30" X125"

## (undated) DEVICE — GLV 8 PROTEXIS (WHITE)

## (undated) DEVICE — POSITIONER FOAM EGG CRATE ULNAR 2PCS (PINK)

## (undated) DEVICE — SUT SILK 2-0 18" FS

## (undated) DEVICE — VENODYNE/SCD SLEEVE CALF MEDIUM